# Patient Record
Sex: FEMALE | Race: WHITE | NOT HISPANIC OR LATINO | Employment: FULL TIME | URBAN - METROPOLITAN AREA
[De-identification: names, ages, dates, MRNs, and addresses within clinical notes are randomized per-mention and may not be internally consistent; named-entity substitution may affect disease eponyms.]

---

## 2017-10-17 ENCOUNTER — OFFICE VISIT (OUTPATIENT)
Dept: NEUROLOGY | Facility: CLINIC | Age: 57
End: 2017-10-17

## 2017-10-17 VITALS
BODY MASS INDEX: 22.96 KG/M2 | SYSTOLIC BLOOD PRESSURE: 132 MMHG | WEIGHT: 155 LBS | DIASTOLIC BLOOD PRESSURE: 80 MMHG | HEIGHT: 69 IN

## 2017-10-17 DIAGNOSIS — R51.9 CHRONIC DAILY HEADACHE: ICD-10-CM

## 2017-10-17 DIAGNOSIS — G43.719 INTRACTABLE CHRONIC MIGRAINE WITHOUT AURA AND WITHOUT STATUS MIGRAINOSUS: Primary | ICD-10-CM

## 2017-10-17 DIAGNOSIS — G44.40 ANALGESIC OVERUSE HEADACHE: ICD-10-CM

## 2017-10-17 DIAGNOSIS — T39.95XA ANALGESIC OVERUSE HEADACHE: ICD-10-CM

## 2017-10-17 PROCEDURE — 99244 OFF/OP CNSLTJ NEW/EST MOD 40: CPT | Performed by: PSYCHIATRY & NEUROLOGY

## 2017-10-17 RX ORDER — RIZATRIPTAN BENZOATE 5 MG/1
TABLET ORAL
Refills: 3 | COMMUNITY
Start: 2017-09-25 | End: 2018-03-30 | Stop reason: SDUPTHER

## 2017-10-17 RX ORDER — FLUTICASONE PROPIONATE 50 MCG
SPRAY, SUSPENSION (ML) NASAL
Refills: 5 | COMMUNITY
Start: 2017-09-20 | End: 2018-02-21

## 2017-10-17 RX ORDER — CETIRIZINE HYDROCHLORIDE 5 MG/1
5 TABLET ORAL DAILY
COMMUNITY
End: 2018-02-21

## 2017-10-17 RX ORDER — TOPIRAMATE 25 MG/1
TABLET ORAL
Refills: 6 | COMMUNITY
Start: 2017-10-13 | End: 2017-10-25 | Stop reason: SDUPTHER

## 2017-10-17 RX ORDER — AZELASTINE 1 MG/ML
SPRAY, METERED NASAL
Refills: 5 | COMMUNITY
Start: 2017-08-09 | End: 2018-02-21

## 2017-10-17 NOTE — PROGRESS NOTES
Subjective   Radha Stafford is a 57 y.o. female.     History of Present Illness     The following portions of the patient's history were reviewed today and updated as appropriate:  allergies, current medications, past family history, past medical history, past social history, past surgical history and problem list.      Chief complaint is headache and the patient is seen today in consultation at the request of the referring health care provider  The time I spent with the patient today was used discussing the differential diagnosis, treatment and management options and prognosis. I addressed all of the patient's questions.  The patient's recent CT scan of the head and the report reviewed by me today and both indicate no abnormality. The patient's allergy reports and outside records were also reviewed by me today and summary state that her headaches may be either attention related migraines or allergy related to to sinus problems.  Families of history of migraines in her father, and her mother had sinus problems later on in life. She has side head sinus problems for a while but she also has migraine type headaches that she started having in her 30s usually unilateral and that recurred now in July this year to get her with tension type headaches that in the back of the head go to the front and viselike.  She has been overusing over-the-counter medications but now restricts her self to 1-2 Excedrin a week.    Review of Systems   Constitutional: Negative for appetite change, chills and fatigue.   HENT: Negative for congestion, ear pain, facial swelling and sinus pressure.    Eyes: Negative for pain and redness.   Respiratory: Negative for shortness of breath.    Cardiovascular: Negative for chest pain.   Gastrointestinal: Negative for abdominal pain.   Endocrine: Negative for cold intolerance and heat intolerance.   Genitourinary: Negative for dysuria.   Musculoskeletal: Negative for arthralgias.   Skin: Negative for rash.  "  Allergic/Immunologic: Negative for immunocompromised state.   Neurological: Positive for headaches.   Hematological: Negative for adenopathy.   Psychiatric/Behavioral: Negative for hallucinations.         Objective      height is 69\" (175.3 cm) and weight is 155 lb (70.3 kg). Her blood pressure is 132/80.     The patient's general appearance was normal today  Carotid pulses were palpable bilaterally  The ophthalmological exam showed the refractory media clear, no blurring of disc margins, there were no hemorrhages noted, blood vessels appeared normal.      Neurologic Exam     Mental Status   Oriented to person.   Oriented to place. Oriented to country, city, area and street.   Oriented to time. Oriented to year, month, date, day and season.   Registration: recalls 3 of 3 objects. Recall at 5 minutes: recalls 3 of 3 objects. Follows 3 step commands.   Attention: normal.   Speech: speech is normal   Level of consciousness: alert  Knowledge: consistent with education.   Able to name object. Able to read. Able to repeat. Able to write. Normal comprehension.     Cranial Nerves     CN II   Visual fields full to confrontation.     CN III, IV, VI   Right pupil: Shape: regular. Consensual response: intact. Accommodation: intact.   Left pupil: Shape: regular. Consensual response: intact.   CN III: no CN III palsy  CN VI: no CN VI palsy  Nystagmus: none   Diplopia: none  Ophthalmoparesis: none  Upgaze: normal  Downgaze: normal  Conjugate gaze: present  Vestibulo-ocular reflex: present    CN V   Facial sensation intact.     CN VII   Facial expression full, symmetric.     CN VIII   CN VIII normal.     CN IX, X   CN IX normal.     CN XI   CN XI normal.     CN XII   CN XII normal.     Motor Exam   Muscle bulk: normal  Overall muscle tone: normal  Right arm pronator drift: absent  Left arm pronator drift: absent    Strength   Strength 5/5 except as noted.     Sensory Exam   Light touch normal.     Gait, Coordination, and Reflexes " "    Gait  Gait: normal    Coordination   Romberg: negative  Finger to nose coordination: normal  Heel to shin coordination: normal  Tandem walking coordination: normal    Tremor   Resting tremor: absent  Intention tremor: absent  Action tremor: absent    Reflexes   Reflexes 2+ except as noted.       Assessment/Plan     Radha was seen today for migraine.    Diagnoses and all orders for this visit:    Intractable chronic migraine without aura and without status migrainosus    Chronic daily headache    Analgesic overuse headache        Discussion/Summary:      The patient is to stop Topamax because she has side effects on the medications and I cannot really go up on the dose because of that. She is to try extended-release Topamax and provided her with samples where she goes up by 25 mg every 1-2 weeks until her headaches are controlled or if she develops side effects she would have to contact the office              Disclaimer: This letter was created using dragon voice recognition software.  This voice recognition software may create errors that may go undetected and can impact the meaning of a sentence or paragraph.  The most frequent error is that the software misidentifies \"he\" and \"she\". However, contextual reading is often able to identify this as a voice recognotion error.  Another frequent error is that the software misidentifies \"the patient\" as \"\"          "

## 2017-10-25 RX ORDER — TOPIRAMATE 25 MG/1
25 TABLET ORAL DAILY
Qty: 2 TABLET | Refills: 1 | Status: SHIPPED | OUTPATIENT
Start: 2017-10-25 | End: 2017-10-26

## 2017-10-26 ENCOUNTER — TELEPHONE (OUTPATIENT)
Dept: NEUROLOGY | Facility: CLINIC | Age: 57
End: 2017-10-26

## 2017-10-26 RX ORDER — TOPIRAMATE 25 MG/1
25 TABLET ORAL DAILY
Qty: 30 TABLET | Refills: 1 | Status: CANCELLED | OUTPATIENT
Start: 2017-10-26

## 2017-10-26 RX ORDER — TOPIRAMATE 50 MG/1
50 CAPSULE, EXTENDED RELEASE ORAL NIGHTLY
Qty: 30 EACH | Refills: 11 | Status: SHIPPED | OUTPATIENT
Start: 2017-10-26 | End: 2017-11-15 | Stop reason: SDUPTHER

## 2017-10-26 NOTE — TELEPHONE ENCOUNTER
----- Message from Fifi Mcmahon PA-C sent at 10/26/2017 11:32 AM EDT -----  Contact: ROZ  I just sent a script in for Qudexy 50mg nightly. Thanks!!     ----- Message -----     From: Kayla Briceno CMA     Sent: 10/26/2017  11:15 AM       To: Fifi Mcmahon PA-C    Pt said that Dr. Carcamo gave her samples of  (Topomax ER Capsules) Quedxyxr 25 mg tablets and told her to increase and she is now up to 50 mgs and wants to know if you could send her a script into the pharmacy on file please?  ----- Message -----     From: Fifi Mcmahon PA-C     Sent: 10/26/2017   9:52 AM       To: Kayla Briceno CMA    Hmm, that's strange! Would you care to ask if it was the Trokendi that he gave her? And she would like 50mg nightly? Sorry, his note didn't specify. Thanks so much!     ----- Message -----     From: Kayla Briceno CMA     Sent: 10/26/2017   9:35 AM       To: Fifi Mcmahon PA-C    This is the pt you said you could write the script for but I guess we got it wrong is there any way you could fix this?  Thanks  ----- Message -----     From: Temi Herrera     Sent: 10/26/2017   9:24 AM       To: [facility]    ERIC Garcia called about her TOPAMAX prescription that had been called in yesterday.  She went to pick it up, but they only gave her 2 (25 mg capsules).  She was confused as to why she only got 2 pills.  Also, Dr. Carcamo gave her samples that were Extended Release and she would like that again if possible in 50 mg.     If you have any question, you may call her at [phone].    [facility]/pharmacy [phone]

## 2017-11-15 ENCOUNTER — TELEPHONE (OUTPATIENT)
Dept: NEUROLOGY | Facility: CLINIC | Age: 57
End: 2017-11-15

## 2017-11-15 RX ORDER — TOPIRAMATE 50 MG/1
50 CAPSULE, EXTENDED RELEASE ORAL 2 TIMES DAILY
Qty: 60 EACH | Refills: 1 | Status: SHIPPED | OUTPATIENT
Start: 2017-11-15 | End: 2017-11-28

## 2017-11-15 NOTE — TELEPHONE ENCOUNTER
Pt called and is taking topiramate 50 mg BID, current Rx is for 50 mg daily. Is it okay for her to increase to 50 BID thanks

## 2017-11-28 ENCOUNTER — OFFICE VISIT (OUTPATIENT)
Dept: NEUROLOGY | Facility: CLINIC | Age: 57
End: 2017-11-28

## 2017-11-28 VITALS
BODY MASS INDEX: 22.96 KG/M2 | HEIGHT: 69 IN | WEIGHT: 155 LBS | SYSTOLIC BLOOD PRESSURE: 130 MMHG | DIASTOLIC BLOOD PRESSURE: 82 MMHG

## 2017-11-28 DIAGNOSIS — G43.009 MIGRAINE WITHOUT AURA AND WITHOUT STATUS MIGRAINOSUS, NOT INTRACTABLE: Primary | ICD-10-CM

## 2017-11-28 PROCEDURE — 99214 OFFICE O/P EST MOD 30 MIN: CPT | Performed by: PHYSICIAN ASSISTANT

## 2017-11-28 RX ORDER — ALPRAZOLAM 0.5 MG/1
TABLET ORAL
Refills: 0 | COMMUNITY
Start: 2017-09-25 | End: 2018-08-24

## 2017-11-28 RX ORDER — ZONISAMIDE 25 MG/1
25 CAPSULE ORAL NIGHTLY
Qty: 120 CAPSULE | Refills: 11 | Status: SHIPPED | OUTPATIENT
Start: 2017-11-28 | End: 2021-03-17

## 2017-11-28 RX ORDER — TRETINOIN 0.5 MG/G
CREAM TOPICAL
Refills: 11 | COMMUNITY
Start: 2017-11-01 | End: 2018-02-21

## 2017-11-28 RX ORDER — DOXYCYCLINE 100 MG/1
CAPSULE ORAL
Refills: 4 | COMMUNITY
Start: 2017-11-01 | End: 2018-02-21

## 2017-11-28 NOTE — PROGRESS NOTES
"Subjective     Chief Complaint: headaches      History of Present Illness   Radha Stafford is a 57 y.o. female who returns to clinic today for evaluation of headaches. She has noted symptoms for at least several years marked initially by a left sided throbbing headache. Her symptoms have worsened since 7/17 and are now daily. Her headaches typically occur in the evening and last for several hours. She notes associated nausea and photosensitivity. She denies any associated vomiting or visual changes. Her headaches are worse with increased stress.     She also notes a different type of headache. This is described as a band around her head and is worse with increased stress.     Prior evaluation has included a head CT, which was reportedly unremarkable. She is currently taking Qudexy XR 50mg BID. Though this has been somewhat beneficial, though she notes associated fatigue. She was unable to tolerate topiramate.  She also takes Excedrin 5-6 times per week, which is beneficial.       I have reviewed and confirmed the past family, social and medical history as accurate on 11/28/17.     Review of Systems   Constitutional: Negative.    HENT: Negative.    Eyes: Negative.    Respiratory: Negative.    Cardiovascular: Negative.    Gastrointestinal: Negative.    Endocrine: Negative.    Genitourinary: Negative.    Musculoskeletal: Negative.    Skin: Negative.    Allergic/Immunologic: Negative.    Neurological:        As noted in HPI   Hematological: Negative.    Psychiatric/Behavioral: Negative.        Objective     /82  Ht 69\" (175.3 cm)  Wt 155 lb (70.3 kg)  BMI 22.89 kg/m2    General appearance today is normal.       Physical Exam   Neurological: She has normal strength. She has a normal Finger-Nose-Finger Test.   Psychiatric: Her speech is normal.        Neurologic Exam     Mental Status   Speech: speech is normal   Level of consciousness: alert  Normal comprehension.     Cranial Nerves   Cranial nerves II through XII " intact.     Motor Exam   Muscle bulk: normal  Overall muscle tone: normal    Strength   Strength 5/5 throughout.     Sensory Exam   Light touch normal.     Gait, Coordination, and Reflexes     Coordination   Finger to nose coordination: normal    Tremor   Resting tremor: absent          Assessment/Plan   Radha was seen today for migraine.    Diagnoses and all orders for this visit:    Migraine without aura and without status migrainosus, not intractable    Other orders  -     zonisamide (ZONEGRAN) 25 MG capsule; Take 1 capsule by mouth Every Night.          Discussion/Summary   Radha Stafford returns to clinic today with a history of migraines. After discussing potential treatment options, it was elected to add Zonegran. We also discussed potential future treatments, including Botox. She will then follow up in 2 months, or sooner if needed.       I spent 20 minutes out of 25 minutes face to face with the patient and discussing diagnosis, prognosis, evaluation, current status, treatment options and management.    As part of this visit I discussed the history with the patient .      Fifi Mcmahon PA-C

## 2017-12-07 ENCOUNTER — TELEPHONE (OUTPATIENT)
Dept: NEUROLOGY | Facility: CLINIC | Age: 57
End: 2017-12-07

## 2018-01-30 ENCOUNTER — OFFICE VISIT (OUTPATIENT)
Dept: NEUROLOGY | Facility: CLINIC | Age: 58
End: 2018-01-30

## 2018-01-30 VITALS
BODY MASS INDEX: 23.7 KG/M2 | HEIGHT: 69 IN | SYSTOLIC BLOOD PRESSURE: 116 MMHG | WEIGHT: 160 LBS | DIASTOLIC BLOOD PRESSURE: 82 MMHG

## 2018-01-30 DIAGNOSIS — G43.009 MIGRAINE WITHOUT AURA AND WITHOUT STATUS MIGRAINOSUS, NOT INTRACTABLE: Primary | ICD-10-CM

## 2018-01-30 PROCEDURE — 99214 OFFICE O/P EST MOD 30 MIN: CPT | Performed by: PHYSICIAN ASSISTANT

## 2018-01-30 RX ORDER — AMITRIPTYLINE HYDROCHLORIDE 25 MG/1
25 TABLET, FILM COATED ORAL NIGHTLY
Qty: 120 TABLET | Refills: 11 | Status: SHIPPED | OUTPATIENT
Start: 2018-01-30 | End: 2018-03-30

## 2018-01-30 NOTE — PROGRESS NOTES
"Subjective     Chief Complaint: headaches      History of Present Illness   Radha Stafford is a 57 y.o. female who returns to clinic today for evaluation of migraines. She has noted symptoms for at least several years marked initially by a left sided throbbing headache. Her symptoms have worsened since 7/17 and are now daily. Her headaches typically occur in the evening and last for several hours. She notes associated nausea and photosensitivity. She denies any associated vomiting or visual changes. Her headaches are worse with increased stress.      She also notes a head described as a band of pressure around her head which is worse with increased stress.      Prior evaluation has included a head CT, which was reportedly unremarkable. She is currently taking ZNS 75mg nightly, which is causing some fatigue. She has tried Qudexy XR 50mg BID and TPM, though these were not tolerated.    Since her last visit in 11/17, her headaches have somewhat improved.      I have reviewed and confirmed the past family, social and medical history as accurate on 1/30/18.     Review of Systems   Constitutional: Negative.    HENT: Negative.    Eyes: Negative.    Respiratory: Negative.    Cardiovascular: Negative.    Gastrointestinal: Negative.    Endocrine: Negative.    Genitourinary: Negative.    Musculoskeletal: Negative.    Skin: Negative.    Allergic/Immunologic: Negative.    Neurological:        As noted in HPI   Hematological: Negative.    Psychiatric/Behavioral: Negative.        Objective     /82  Ht 175.3 cm (69\")  Wt 72.6 kg (160 lb)  BMI 23.63 kg/m2    General appearance today is normal.       Physical Exam   Neurological: She has normal strength. She has a normal Finger-Nose-Finger Test.   Psychiatric: Her speech is normal.        Neurologic Exam     Mental Status   Speech: speech is normal   Level of consciousness: alert  Normal comprehension.     Cranial Nerves   Cranial nerves II through XII intact.     Motor Exam "   Muscle bulk: normal  Overall muscle tone: normal    Strength   Strength 5/5 throughout.     Sensory Exam   Light touch normal.     Gait, Coordination, and Reflexes     Coordination   Finger to nose coordination: normal    Tremor   Resting tremor: absent          Assessment/Plan   Radha was seen today for migraine.    Diagnoses and all orders for this visit:    Migraine without aura and without status migrainosus, not intractable    Other orders  -     amitriptyline (ELAVIL) 25 MG tablet; Take 1 tablet by mouth Every Night.          Discussion/Summary   Radha Stafford returns to clinic today with a history of migraines. I again reviewed her current status and treatment options. After discussing potential treatment options, it was elected to continue on ZNS 75mg nightly and add Amitriptyline. We may next consider Botox therapy.She will then follow up in 2 months, or sooner if needed.       I spent 20 minutes out of 25 minutes face to face with the patient and discussing diagnosis, prognosis, evaluation, current status, treatment options and management.    As part of this visit I discussed the history with the patient .      Fifi Mcmahon PA-C

## 2018-02-09 ENCOUNTER — TELEPHONE (OUTPATIENT)
Dept: NEUROLOGY | Facility: CLINIC | Age: 58
End: 2018-02-09

## 2018-02-09 NOTE — TELEPHONE ENCOUNTER
----- Message from Alka Figueroa sent at 2/9/2018  8:52 AM EST -----  Contact: Radhakeegan Calix    Radha called and said the amitriptyline has been making her headaches worse in the morning.  She quit taking it on Saturday night and now her headaches are back to being bad in the evening. She was on 25 MG    Please call Radha back  [phone]

## 2018-02-12 RX ORDER — VENLAFAXINE HYDROCHLORIDE 37.5 MG/1
37.5 CAPSULE, EXTENDED RELEASE ORAL DAILY
Qty: 30 CAPSULE | Refills: 11 | Status: SHIPPED | OUTPATIENT
Start: 2018-02-12 | End: 2018-02-21

## 2018-02-12 NOTE — TELEPHONE ENCOUNTER
We can try Effexor 37.5mg if she would like in addition to the zonisamide. If that is not beneficial, we may need to consider Botox next. Let me know if she would like for me to call it in. Thanks!

## 2018-02-21 ENCOUNTER — OFFICE VISIT (OUTPATIENT)
Dept: NEUROLOGY | Facility: CLINIC | Age: 58
End: 2018-02-21

## 2018-02-21 VITALS
BODY MASS INDEX: 23.7 KG/M2 | SYSTOLIC BLOOD PRESSURE: 110 MMHG | WEIGHT: 160 LBS | HEIGHT: 69 IN | DIASTOLIC BLOOD PRESSURE: 64 MMHG

## 2018-02-21 DIAGNOSIS — G43.009 MIGRAINE WITHOUT AURA AND WITHOUT STATUS MIGRAINOSUS, NOT INTRACTABLE: Primary | ICD-10-CM

## 2018-02-21 PROCEDURE — 99214 OFFICE O/P EST MOD 30 MIN: CPT | Performed by: PHYSICIAN ASSISTANT

## 2018-02-21 RX ORDER — VENLAFAXINE HYDROCHLORIDE 37.5 MG/1
37.5 CAPSULE, EXTENDED RELEASE ORAL DAILY
Qty: 30 CAPSULE | Refills: 11 | Status: SHIPPED | OUTPATIENT
Start: 2018-02-21 | End: 2018-03-30

## 2018-02-21 NOTE — PROGRESS NOTES
"Subjective     Chief Complaint: headaches      History of Present Illness   Radha Stafford is a 57 y.o. female who returns to clinic today for evaluation of migraines. She has noted symptoms for at least several years marked initially by a left sided throbbing headache. Her symptoms have worsened since 7/17 and are now daily. Her headaches typically occur in the evening and last for several hours. She notes associated nausea and photosensitivity. She denies any associated vomiting or visual changes. Her headaches are worse with increased stress.       She also notes a head described as a band of pressure around her head which is worse with increased stress.       Prior evaluation has included a head CT, which was reportedly unremarkable. She is currently taking ZNS 100mg nightly, which is causing some fatigue. She has tried Qudexy XR 50mg BID, TPM and amitriptyline, though these were not tolerated.    Since her last visit in 1/30/18, she notes that her headaches have worsened. She notes over 15 headaches a month, lasting at least 4 hours per day.       I have reviewed and confirmed the past family, social and medical history as accurate on 2/21/18.     Review of Systems   Constitutional: Negative.    HENT: Negative.    Eyes: Negative.    Respiratory: Negative.    Cardiovascular: Negative.    Gastrointestinal: Negative.    Endocrine: Negative.    Genitourinary: Negative.    Musculoskeletal: Negative.    Skin: Negative.    Allergic/Immunologic: Negative.    Neurological:        As noted in HPI   Hematological: Negative.    Psychiatric/Behavioral: Negative.        Objective     /64  Ht 175.3 cm (69\")  Wt 72.6 kg (160 lb)  BMI 23.63 kg/m2    General appearance today is normal.       Physical Exam   Neurological: She has normal strength. She has a normal Finger-Nose-Finger Test.   Psychiatric: Her speech is normal.        Neurologic Exam     Mental Status   Speech: speech is normal   Level of consciousness: " alert  Normal comprehension.     Cranial Nerves   Cranial nerves II through XII intact.     Motor Exam   Muscle bulk: normal  Overall muscle tone: normal    Strength   Strength 5/5 throughout.     Sensory Exam   Light touch normal.     Gait, Coordination, and Reflexes     Coordination   Finger to nose coordination: normal    Tremor   Resting tremor: absent          Assessment/Plan   Radha was seen today for migraine.    Diagnoses and all orders for this visit:    Migraine without aura and without status migrainosus, not intractable    Other orders  -     venlafaxine XR (EFFEXOR XR) 37.5 MG 24 hr capsule; Take 1 capsule by mouth Daily.          Discussion/Summary   Radha Stafford returns to clinic today with a history of migraines. I again reviewed her current status and treatment options. After discussing potential treatment options, it was elected to add Effexor XR 37.5mg daily and continue on ZNS unchanged. We will also begin Botox therapy given the severity and frequency of her headaches. We discussed risk and benefits of Botox procedure and common side effects including headache, neck pain, neck stiffness or weakness, ptosis, flu-like symptoms as well as more serious possible adverse effects. She will then follow up in 4-6 weeks, or soon if needed.       I spent 20 minutes out of 25 minutes face to face with the patient and discussing diagnosis, prognosis, diagnostic testing, evaluation, current status, treatment options and management.    As part of this visit I discussed the history with the patient .      Fifi Mcmahon PA-C

## 2018-03-30 ENCOUNTER — OFFICE VISIT (OUTPATIENT)
Dept: NEUROLOGY | Facility: CLINIC | Age: 58
End: 2018-03-30

## 2018-03-30 VITALS — BODY MASS INDEX: 23.7 KG/M2 | HEIGHT: 69 IN | WEIGHT: 160 LBS

## 2018-03-30 DIAGNOSIS — G43.009 MIGRAINE WITHOUT AURA AND WITHOUT STATUS MIGRAINOSUS, NOT INTRACTABLE: Primary | ICD-10-CM

## 2018-03-30 PROCEDURE — 99214 OFFICE O/P EST MOD 30 MIN: CPT | Performed by: PHYSICIAN ASSISTANT

## 2018-03-30 RX ORDER — RIZATRIPTAN BENZOATE 5 MG/1
5 TABLET ORAL DAILY PRN
Qty: 9 TABLET | Refills: 5 | Status: SHIPPED | OUTPATIENT
Start: 2018-03-30 | End: 2018-12-27 | Stop reason: SDUPTHER

## 2018-03-30 RX ORDER — VENLAFAXINE HYDROCHLORIDE 75 MG/1
75 CAPSULE, EXTENDED RELEASE ORAL DAILY
Qty: 30 CAPSULE | Refills: 11 | Status: SHIPPED | OUTPATIENT
Start: 2018-03-30 | End: 2019-03-28 | Stop reason: SDUPTHER

## 2018-03-30 NOTE — PROGRESS NOTES
"Subjective     Chief Complaint: headaches      History of Present Illness   Radha Stafford is a 57 y.o. female who returns to clinic today for evaluation of migraines. She has noted symptoms for at least several years marked initially by a left sided throbbing headache. Her symptoms have worsened since 7/17 and are now daily. Her headaches typically occur in the evening and last for several hours. She notes associated nausea and photosensitivity. She denies any associated vomiting or visual changes. Her headaches are worse with increased stress.       She also notes a head described as a band of pressure around her head which is worse with increased stress.       Prior evaluation has included a head CT, which was reportedly unremarkable. She is currently taking ZNS 100mg nightly, which is causing some fatigue. She is also taking Effexor XR 37.5mg, which has been somewhat beneficial.  She has tried Qudexy XR 50mg BID, TPM and amitriptyline, though these were not tolerate    She continues to note over 15 headaches a month, lasting at least 4 hours per day.       I have reviewed and confirmed the past family, social and medical history as accurate on 2/21/18.     Review of Systems   Constitutional: Negative.    HENT: Negative.    Eyes: Negative.    Respiratory: Negative.    Cardiovascular: Negative.    Gastrointestinal: Negative.    Endocrine: Negative.    Genitourinary: Negative.    Musculoskeletal: Negative.    Skin: Negative.    Allergic/Immunologic: Negative.    Neurological:        As noted in HPI   Hematological: Negative.    Psychiatric/Behavioral: Negative.        Objective     Ht 175.3 cm (69\")   Wt 72.6 kg (160 lb)   BMI 23.63 kg/m²     General appearance today is normal.     Physical Exam   Neurological: She has normal strength. She has a normal Finger-Nose-Finger Test.   Psychiatric: Her speech is normal.        Neurologic Exam     Mental Status   Speech: speech is normal   Level of consciousness: " alert  Normal comprehension.     Cranial Nerves   Cranial nerves II through XII intact.     Motor Exam   Muscle bulk: normal  Overall muscle tone: normal    Strength   Strength 5/5 throughout.     Sensory Exam   Light touch normal.     Gait, Coordination, and Reflexes     Coordination   Finger to nose coordination: normal    Tremor   Resting tremor: absent        Assessment/Plan   Radha was seen today for migraine.    Diagnoses and all orders for this visit:    Migraine without aura and without status migrainosus, not intractable    Other orders  -     venlafaxine XR (EFFEXOR XR) 75 MG 24 hr capsule; Take 1 capsule by mouth Daily.  -     rizatriptan (MAXALT) 5 MG tablet; Take 1 tablet by mouth Daily As Needed for Migraine. May repeat in 2 hours if needed          Discussion/Summary   Radha Stafford returns to clinic today with a history of migraines. I again reviewed her current status and treatment options. After discussing potential treatment options, it was elected to increase Effexor XR to 75mg daily and continue on ZNS unchanged. We will next consider Botox therapy given the severity and frequency of her headaches. We discussed risk and benefits of Botox procedure and common side effects including headache, neck pain, neck stiffness or weakness, ptosis, flu-like symptoms as well as more serious possible adverse effects. She will then follow up in 6 weeks, or soon if needed.       I spent 20 minutes out of 25 minutes face to face with the patient and discussing diagnosis, evaluation, current status, treatment options and management.    As part of this visit I discussed the history with the patient .      Fifi Mcmahon PA-C

## 2018-05-29 ENCOUNTER — OFFICE VISIT (OUTPATIENT)
Dept: NEUROLOGY | Facility: CLINIC | Age: 58
End: 2018-05-29

## 2018-05-29 VITALS
DIASTOLIC BLOOD PRESSURE: 80 MMHG | SYSTOLIC BLOOD PRESSURE: 115 MMHG | HEIGHT: 69 IN | WEIGHT: 160 LBS | BODY MASS INDEX: 23.7 KG/M2

## 2018-05-29 DIAGNOSIS — G43.009 MIGRAINE WITHOUT AURA AND WITHOUT STATUS MIGRAINOSUS, NOT INTRACTABLE: Primary | ICD-10-CM

## 2018-05-29 PROCEDURE — 99214 OFFICE O/P EST MOD 30 MIN: CPT | Performed by: PHYSICIAN ASSISTANT

## 2018-05-29 RX ORDER — LEVOFLOXACIN 500 MG/1
TABLET, FILM COATED ORAL
COMMUNITY
Start: 2018-05-24 | End: 2018-08-24

## 2018-05-29 RX ORDER — FLUTICASONE PROPIONATE 50 MCG
SPRAY, SUSPENSION (ML) NASAL
Refills: 5 | COMMUNITY
Start: 2018-05-24

## 2018-05-29 NOTE — PROGRESS NOTES
"Subjective     Chief Complaint: headachese     History of Present Illness   Radha Stafford is a 57 y.o. female who returns to clinic today for evaluation of migraines. She has noted symptoms for at least several years marked initially by a left sided throbbing headache. Her symptoms have worsened since 7/17 and are now daily. Her headaches typically occur in the evening and last for several hours. She notes associated nausea and photosensitivity. She denies any associated vomiting or visual changes. Her headaches are worse with increased stress.       She also notes a head described as a band of pressure around her head which is worse with increased stress.       Prior evaluation has included a head CT, which was reportedly unremarkable. She is currently taking ZNS 100mg nightly, which is causing some fatigue. She is also taking Effexor XR 75mg, which has been somewhat beneficial and Maxalt as needed.  She has tried Qudexy XR 50mg BID, TPM and amitriptyline, though these were not tolerate     Today: Since her last visit in 3/18, she notes that her headaches have improved in frequency and severity.       I have reviewed and confirmed the past family, social and medical history as accurate on 5/29/18.     Review of Systems   Constitutional: Negative.    Eyes: Negative.    Respiratory: Negative.    Cardiovascular: Negative.    Gastrointestinal: Negative.    Endocrine: Negative.    Genitourinary: Negative.    Musculoskeletal: Negative.    Skin: Negative.    Allergic/Immunologic: Negative.    Neurological: Positive for headaches.   Hematological: Negative.    Psychiatric/Behavioral: Negative.        Objective     /80   Ht 175.3 cm (69.02\")   Wt 72.6 kg (160 lb)   BMI 23.62 kg/m²     General appearance today is normal.       Physical Exam   Neurological: She has normal strength. She has a normal Finger-Nose-Finger Test.   Psychiatric: Her speech is normal.        Neurologic Exam     Mental Status   Speech: speech is " normal   Level of consciousness: alert  Normal comprehension.     Cranial Nerves   Cranial nerves II through XII intact.     Motor Exam   Muscle bulk: normal  Overall muscle tone: normal    Strength   Strength 5/5 throughout.     Sensory Exam   Light touch normal.     Gait, Coordination, and Reflexes     Coordination   Finger to nose coordination: normal    Tremor   Resting tremor: absent        Assessment/Plan   Radha was seen today for migraine.    Diagnoses and all orders for this visit:    Migraine without aura and without status migrainosus, not intractable          Discussion/Summary   Radha Stafford returns to clinic today with a history of migraines. I again reviewed her current status and treatment options. After discussing potential treatment options, it was elected to continue on her current medications unchanged. We discussed botox treatment and Aimovig, both of which she may consider in the future. She will then follow up in 1 month, or sooner if needed.   I spent 20 minutes out of 25 minutes face to face with the patient and discussing diagnosis, prognosis, evaluation, current status, treatment options and management as discussed above.       As part of this visit I discussed the history with the patient .      Fifi Mcmahon PA-C

## 2018-06-13 ENCOUNTER — TELEPHONE (OUTPATIENT)
Dept: NEUROLOGY | Facility: CLINIC | Age: 58
End: 2018-06-13

## 2018-06-13 NOTE — TELEPHONE ENCOUNTER
Unfortunately, one of the only options that would not cause sedation would be increasing her Effexor to 150mg. We had discussed Aimovig at her last visit, which I think still may be a very good option. The primary side effect sometimes associated with this medication is constipation. Let me know what she would like to do. Thanks!

## 2018-06-13 NOTE — TELEPHONE ENCOUNTER
----- Message from Alka Figueroa sent at 6/13/2018  1:48 PM EDT -----  Contact: Radha Garcia called regarding her prescriptions. She's taking Zonisamide at night and Effexor in morning. She's really tired in the evenings, but doing okay at work. Around 3-4 pm, she starts to get headaches.    Radha wondered if there was any other way to take the medications that would maybe make her less tired?    Please call Radha back  [phone]

## 2018-06-13 NOTE — TELEPHONE ENCOUNTER
Spoke with PT, she is going to hold off on making any med changes or adding a new med. She wants to wait for her FU on 7/6/18 @ 1130 to discuss possibly starting Aimovig injections.

## 2018-07-06 ENCOUNTER — OFFICE VISIT (OUTPATIENT)
Dept: NEUROLOGY | Facility: CLINIC | Age: 58
End: 2018-07-06

## 2018-07-06 ENCOUNTER — TELEPHONE (OUTPATIENT)
Dept: NEUROLOGY | Facility: CLINIC | Age: 58
End: 2018-07-06

## 2018-07-06 VITALS
SYSTOLIC BLOOD PRESSURE: 126 MMHG | BODY MASS INDEX: 23.7 KG/M2 | HEIGHT: 69 IN | WEIGHT: 160 LBS | DIASTOLIC BLOOD PRESSURE: 62 MMHG

## 2018-07-06 DIAGNOSIS — G43.009 MIGRAINE WITHOUT AURA AND WITHOUT STATUS MIGRAINOSUS, NOT INTRACTABLE: Primary | ICD-10-CM

## 2018-07-06 PROCEDURE — 99214 OFFICE O/P EST MOD 30 MIN: CPT | Performed by: PHYSICIAN ASSISTANT

## 2018-07-06 NOTE — TELEPHONE ENCOUNTER
----- Message from Fifi Mcmahon PA-C sent at 7/6/2018  1:35 PM EDT -----  Kayla,    Would you care to call and let Ms. Stafford know that I looked into insurance coverage with Aimovig. Her previous approval for Botox should not be any issue for approval. If she would like, we can go ahead and start the approval process. Just let me know. Thanks!!

## 2018-07-06 NOTE — PROGRESS NOTES
"Subjective     Chief Complaint: headache      History of Present Illness   Radha Stafford is a 57 y.o. female who returns to clinic today for evaluation of migraines. She has noted symptoms for at least several years marked initially by a left sided throbbing headache. Her symptoms have worsened since 7/17 and are now daily. Her headaches typically occur in the evening and last for several hours. She notes associated nausea and photosensitivity. She denies any associated vomiting or visual changes. Her headaches are worse with increased stress.       She also notes a head described as a band of pressure around her head which is worse with increased stress.       Prior evaluation has included a head CT, which was reportedly unremarkable. She is currently taking ZNS 100mg nightly, which is causing some fatigue. She is also taking Effexor XR 75mg, which has been somewhat beneficial and Maxalt as needed.  She has tried Qudexy XR 50mg BID, TPM and amitriptyline, though these were not tolerate    Today: Since her last visit in 5/18, she notes that her headaches are unchanged.        I have reviewed and confirmed the past family, social and medical history as accurate on 7/6/18.     Review of Systems   Constitutional: Negative.    Eyes: Negative.    Respiratory: Negative.    Cardiovascular: Negative.    Gastrointestinal: Negative.    Endocrine: Negative.    Genitourinary: Negative.    Musculoskeletal: Negative.    Skin: Negative.    Allergic/Immunologic: Negative.    Neurological: Positive for headaches.   Hematological: Negative.    Psychiatric/Behavioral: Negative.        Objective     /62   Ht 175.3 cm (69\")   Wt 72.6 kg (160 lb)   BMI 23.63 kg/m²     General appearance today is normal.     Physical Exam   Neurological: She has normal strength. She has a normal Finger-Nose-Finger Test. Gait normal.   Psychiatric: Her speech is normal.        Neurologic Exam     Mental Status   Speech: speech is normal   Level of " consciousness: alert  Normal comprehension.     Cranial Nerves   Cranial nerves II through XII intact.     Motor Exam   Muscle bulk: normal  Overall muscle tone: normal    Strength   Strength 5/5 throughout.     Sensory Exam   Light touch normal.     Gait, Coordination, and Reflexes     Gait  Gait: normal    Coordination   Finger to nose coordination: normal    Tremor   Resting tremor: absent          Assessment/Plan   Radha was seen today for migraine.    Diagnoses and all orders for this visit:    Migraine without aura and without status migrainosus, not intractable  -     erenumab-aooe (AIMOVIG) prefilled syringe 70 mg; Inject 70 mg under the skin 1 (One) Time.          Discussion/Summary   Radha Stafford returns to clinic today with a history of migraines. I again reviewed her current status and treatment options. After discussing potential treatment options, it was elected to continue on ZNS and Effexor unchanged. We will also add Aimovig. Additionally, I have given her samples of Cambia to try as needed. She will then follow up in 6-8 weeks, or sooner if needed.   I spent 20 minutes out of 25 minutes face to face with the patient and discussing diagnosis, evaluation, current status, treatment options and management as discussed above.       As part of this visit I discussed the history with the patient .      Fifi Mcmahon PA-C

## 2018-07-20 NOTE — TELEPHONE ENCOUNTER
Spoke with Aimovig rep  ICD 10 is listed as the Pt's Dx code which I believe should actually be G43.009.  Rep states this code needs to be written along with the case number on an Rx pad and faxed to them along with the [employer] Consent form with Pt's info and electronic signature.    case # [id number]  Fx# [phone]    Taking care of this now. Pt Notified.

## 2018-07-20 NOTE — TELEPHONE ENCOUNTER
PATIENT WANTED TO KNOW WHERE HER AIMOVIG WAS AT IN THE PROCESS     ALSO HER HEADACHES ARE MORE INTENSE IN THE EVENINGS.     SHE WANTED TO KNOW IF THERE IS SOMETHING SHE NEEDED TO DO DIFFERENTLY. SHE IS A BIT AFRAID TO INCREASE HER MEDICATION BECAUSE IT MAKES HER FEEL LIKE SHE IS 'OUT OF IT' AND KIND OF IN [place]'

## 2018-08-24 ENCOUNTER — OFFICE VISIT (OUTPATIENT)
Dept: NEUROLOGY | Facility: CLINIC | Age: 58
End: 2018-08-24

## 2018-08-24 VITALS
WEIGHT: 160 LBS | SYSTOLIC BLOOD PRESSURE: 118 MMHG | DIASTOLIC BLOOD PRESSURE: 68 MMHG | BODY MASS INDEX: 23.7 KG/M2 | HEIGHT: 69 IN

## 2018-08-24 DIAGNOSIS — G43.009 MIGRAINE WITHOUT AURA AND WITHOUT STATUS MIGRAINOSUS, NOT INTRACTABLE: Primary | ICD-10-CM

## 2018-08-24 PROCEDURE — 99214 OFFICE O/P EST MOD 30 MIN: CPT | Performed by: PHYSICIAN ASSISTANT

## 2018-08-24 RX ORDER — EPINEPHRINE 0.3 MG/.3ML
INJECTION INTRAMUSCULAR
COMMUNITY
Start: 2018-07-02

## 2018-08-24 NOTE — PROGRESS NOTES
"Subjective     Chief Complaint: headaches      History of Present Illness   Radha Stafford is a 58 y.o. female who returns to clinic today for evaluation of migraines. She has noted symptoms for at least several years marked initially by a left sided throbbing headache. Her symptoms have worsened since 7/17 and are now daily. Her headaches typically occur in the evening and last for several hours. She notes associated nausea and photosensitivity. She denies any associated vomiting or visual changes. Her headaches are worse with increased stress.       She also notes a head described as a band of pressure around her head which is worse with increased stress.       Prior evaluation has included a head CT, which was reportedly unremarkable. She is currently taking ZNS 100mg nightly, which is causing some fatigue. She is also taking Effexor XR 75mg, which has been somewhat beneficial and Maxalt as needed.  She has tried Qudexy XR 50mg BID, TPM and amitriptyline, though these were not tolerated.     Today: Since her last visit in 7/18, she notes that her headaches have somewhat improved since she cut gluten out of her diet. She continues to note 1-2 headaches a week, primarily after exercising.       I have reviewed and confirmed the past family, social and medical history as accurate on 8/24/18.     Review of Systems   Constitutional: Negative.    Eyes: Negative.    Respiratory: Negative.    Cardiovascular: Negative.    Gastrointestinal: Negative.    Endocrine: Negative.    Genitourinary: Negative.    Musculoskeletal: Negative.    Skin: Negative.    Allergic/Immunologic: Negative.    Neurological: Positive for headaches.   Hematological: Negative.    Psychiatric/Behavioral: Negative.        Objective     /68   Ht 175.3 cm (69\")   Wt 72.6 kg (160 lb)   BMI 23.63 kg/m²     General appearance today is normal.       Physical Exam   Neurological: She has normal strength. She has a normal Finger-Nose-Finger Test. "   Psychiatric: Her speech is normal.        Neurologic Exam     Mental Status   Speech: speech is normal   Level of consciousness: alert  Normal comprehension.     Cranial Nerves   Cranial nerves II through XII intact.     Motor Exam   Muscle bulk: normal  Overall muscle tone: normal    Strength   Strength 5/5 throughout.     Sensory Exam   Light touch normal.     Gait, Coordination, and Reflexes     Coordination   Finger to nose coordination: normal          Assessment/Plan   Radha was seen today for migraine.    Diagnoses and all orders for this visit:    Migraine without aura and without status migrainosus, not intractable  -     MRI Brain Without Contrast          Discussion/Summary   Radha Stafford returns to clinic today with a history of migraines. I again reviewed her current status and treatment options. It was elected to obtain an MRI of the brain. After discussing potential treatment options, it was elected to continue on ZNS and Effexor unchanged and add Aimovig (sample administered today in clinic). She will then follow up in 2 months , or sooner if needed.   I spent 25 minutes minutes face to face with the patient with 20 minutes spent on discussing diagnosis, prognosis, diagnostic testing, evaluation, current status, treatment options and management as discussed above.       As part of this visit I discussed the history with the patient .      Fifi Mcmahon PA-C

## 2018-08-28 ENCOUNTER — HOSPITAL ENCOUNTER (OUTPATIENT)
Dept: MRI IMAGING | Facility: HOSPITAL | Age: 58
Discharge: HOME | End: 2018-08-28
Admitting: PHYSICIAN ASSISTANT

## 2018-08-28 PROCEDURE — 70551 MRI BRAIN STEM W/O DYE: CPT

## 2018-08-29 ENCOUNTER — TELEPHONE (OUTPATIENT)
Dept: NEUROLOGY | Facility: CLINIC | Age: 58
End: 2018-08-29

## 2018-09-20 ENCOUNTER — TELEPHONE (OUTPATIENT)
Dept: NEUROLOGY | Facility: CLINIC | Age: 58
End: 2018-09-20

## 2018-10-25 ENCOUNTER — OFFICE VISIT (OUTPATIENT)
Dept: NEUROLOGY | Facility: CLINIC | Age: 58
End: 2018-10-25

## 2018-10-25 VITALS
HEIGHT: 69 IN | WEIGHT: 160 LBS | SYSTOLIC BLOOD PRESSURE: 120 MMHG | DIASTOLIC BLOOD PRESSURE: 72 MMHG | BODY MASS INDEX: 23.7 KG/M2

## 2018-10-25 DIAGNOSIS — G43.009 MIGRAINE WITHOUT AURA AND WITHOUT STATUS MIGRAINOSUS, NOT INTRACTABLE: Primary | ICD-10-CM

## 2018-10-25 PROCEDURE — 99214 OFFICE O/P EST MOD 30 MIN: CPT | Performed by: PHYSICIAN ASSISTANT

## 2018-10-25 NOTE — PROGRESS NOTES
"Subjective     Chief Complaint: headaches      History of Present Illness   Radha Stafford is a 58 y.o. female who returns to clinic today for evaluation of migraines. She has noted symptoms for at least several years marked initially by a left sided throbbing headache. Her symptoms have worsened since 7/17 and are now daily. Her headaches typically occur in the evening and last for several hours. She notes associated nausea and photosensitivity. She denies any associated vomiting or visual changes. Her headaches are worse with increased stress.       She also notes a head described as a band of pressure around her head which is worse with increased stress.       Prior evaluation has included an MRI of the brain, which was reportedly unremarkable. She is taking Aimovig and Effexor XR 75mg as well as Maxalt as needed.  She has tried Qudexy XR 50mg BID, TPM, amitriptyline and zonisamide, though these were not tolerated well.     Today: Since her last visit in 7/18, she notes that her headaches have significantly improved and are currently manageable.        I have reviewed and confirmed the past family, social and medical history as accurate on 10/25/18.     Review of Systems   Constitutional: Negative.    Eyes: Negative.    Respiratory: Negative.    Cardiovascular: Negative.    Gastrointestinal: Negative.    Endocrine: Negative.    Genitourinary: Negative.    Musculoskeletal: Negative.    Skin: Negative.    Allergic/Immunologic: Negative.    Neurological: Positive for headaches.   Hematological: Negative.    Psychiatric/Behavioral: Negative.        Objective     Ht 175.3 cm (69\")   Wt 72.6 kg (160 lb)   BMI 23.63 kg/m²     General appearance today is normal.     Physical Exam   Neurological: She has normal strength. She has a normal Finger-Nose-Finger Test. Gait normal.   Psychiatric: Her speech is normal.        Neurologic Exam     Mental Status   Speech: speech is normal   Level of consciousness: alert  Normal " comprehension.     Cranial Nerves   Cranial nerves II through XII intact.     Motor Exam   Muscle bulk: normal  Overall muscle tone: normal    Strength   Strength 5/5 throughout.     Sensory Exam   Light touch normal.     Gait, Coordination, and Reflexes     Gait  Gait: normal    Coordination   Finger to nose coordination: normal    Tremor   Resting tremor: absent        Assessment/Plan   Radha was seen today for migraine.    Diagnoses and all orders for this visit:    Migraine without aura and without status migrainosus, not intractable          Discussion/Summary   Radha Stafford returns to clinic today with a history of migraines. I again reviewed her current status and treatment options. After discussing potential treatment options, it was elected to continue on Effexor XR and Aimovig unchanged as this has been quite beneficial. She will then follow up in 6 months, or sooner if needed.   I spent 25 minutes minutes face to face with the patient with 20 minutes spent on discussing diagnosis, prognosis, evaluation, current status, treatment options and management as discussed above.       As part of this visit I discussed the history with the patient .      Fifi Mcmahon PA-C

## 2018-12-28 RX ORDER — RIZATRIPTAN BENZOATE 5 MG/1
TABLET ORAL
Qty: 9 TABLET | Refills: 0 | Status: SHIPPED | OUTPATIENT
Start: 2018-12-28 | End: 2019-02-15 | Stop reason: SDUPTHER

## 2019-02-15 ENCOUNTER — TELEPHONE (OUTPATIENT)
Dept: NEUROLOGY | Facility: CLINIC | Age: 59
End: 2019-02-15

## 2019-02-15 RX ORDER — RIZATRIPTAN BENZOATE 5 MG/1
5 TABLET ORAL ONCE AS NEEDED
Qty: 9 TABLET | Refills: 0 | Status: SHIPPED | OUTPATIENT
Start: 2019-02-15 | End: 2019-04-01 | Stop reason: SDUPTHER

## 2019-02-15 NOTE — TELEPHONE ENCOUNTER
----- Message from Prabhjot Perry sent at 2/15/2019 11:43 AM EST -----  Contact: QUANG MURDOCK:    REFILL ON : rizatriptan (MAXALT) 5 MG tablet  PHARMACY: [facility]

## 2019-03-28 RX ORDER — VENLAFAXINE HYDROCHLORIDE 75 MG/1
CAPSULE, EXTENDED RELEASE ORAL
Qty: 30 CAPSULE | Refills: 1 | Status: SHIPPED | OUTPATIENT
Start: 2019-03-28 | End: 2019-05-28 | Stop reason: SDUPTHER

## 2019-04-01 RX ORDER — RIZATRIPTAN BENZOATE 5 MG/1
5 TABLET ORAL ONCE AS NEEDED
Qty: 9 TABLET | Refills: 0 | Status: SHIPPED | OUTPATIENT
Start: 2019-04-01

## 2019-04-25 ENCOUNTER — OFFICE VISIT (OUTPATIENT)
Dept: NEUROLOGY | Facility: CLINIC | Age: 59
End: 2019-04-25

## 2019-04-25 VITALS
HEIGHT: 69 IN | WEIGHT: 160 LBS | DIASTOLIC BLOOD PRESSURE: 88 MMHG | HEART RATE: 86 BPM | SYSTOLIC BLOOD PRESSURE: 128 MMHG | BODY MASS INDEX: 23.7 KG/M2

## 2019-04-25 DIAGNOSIS — G43.009 MIGRAINE WITHOUT AURA AND WITHOUT STATUS MIGRAINOSUS, NOT INTRACTABLE: Primary | ICD-10-CM

## 2019-04-25 PROCEDURE — 99214 OFFICE O/P EST MOD 30 MIN: CPT | Performed by: PHYSICIAN ASSISTANT

## 2019-04-25 RX ORDER — ERENUMAB-AOOE 70 MG/ML
INJECTION SUBCUTANEOUS
Refills: 4 | COMMUNITY
Start: 2019-04-13 | End: 2019-04-25

## 2019-04-25 RX ORDER — POLYETHYLENE GLYCOL 3350, SODIUM SULFATE, SODIUM CHLORIDE, POTASSIUM CHLORIDE, ASCORBIC ACID, SODIUM ASCORBATE 140-9-5.2G
KIT ORAL SEE ADMIN INSTRUCTIONS
Refills: 0 | COMMUNITY
Start: 2019-04-23

## 2019-04-25 NOTE — TELEPHONE ENCOUNTER
She needed the injection to go to [facility] and had informed me of this in the room ,but I did not get it changed in time before Fifi Mcmahon sent it through. Sorry!    Reordered and sent to correct pharmacy.

## 2019-04-25 NOTE — PROGRESS NOTES
"Subjective     Chief Complaint: headaches      History of Present Illness   Radha Stafford is a 58 y.o. female who returns to clinic today for evaluation of migraines. She has noted symptoms for at least several years marked initially by a left sided throbbing headache. Her symptoms have worsened since 7/17 and are now daily. Her headaches typically occur in the evening and last for several hours. She notes associated nausea and photosensitivity. She denies any associated vomiting or visual changes. Her headaches are worse with increased stress.       She also notes a head described as a band of pressure around her head which is worse with increased stress.       Prior evaluation has included an MRI of the brain, which was reportedly unremarkable. She is taking Aimovig and Effexor XR 75mg as well as Maxalt as needed. She has tried Qudexy XR 50mg BID, TPM, amitriptyline and zonisamide, though these were not tolerated well.     Today: Since her last visit in 10/18, she notes that her headaches continue to be improved. She has noted constipation with Aimovig.       I have reviewed and confirmed the past family, social and medical history as accurate on 4/25/19.     Review of Systems   Constitutional: Negative.    Eyes: Negative.    Respiratory: Negative.    Cardiovascular: Negative.    Gastrointestinal: Negative.    Endocrine: Negative.    Genitourinary: Negative.    Musculoskeletal: Negative.    Skin: Negative.    Allergic/Immunologic: Negative.    Neurological: Positive for headaches.   Hematological: Negative.    Psychiatric/Behavioral: Negative.        Objective     /88   Pulse 86   Ht 175.3 cm (69\")   Wt 72.6 kg (160 lb)   BMI 23.63 kg/m²     General appearance today is normal.       Physical Exam   Neurological: She has normal strength. She has a normal Finger-Nose-Finger Test. Gait normal.   Psychiatric: Her speech is normal.        Neurologic Exam     Mental Status   Speech: speech is normal   Level of " consciousness: alert  Normal comprehension.     Cranial Nerves   Cranial nerves II through XII intact.     Motor Exam   Muscle bulk: normal  Overall muscle tone: normal    Strength   Strength 5/5 throughout.     Sensory Exam   Light touch normal.     Gait, Coordination, and Reflexes     Gait  Gait: normal    Coordination   Finger to nose coordination: normal    Tremor   Resting tremor: absent        Assessment/Plan   Radha was seen today for migraine.    Diagnoses and all orders for this visit:    Migraine without aura and without status migrainosus, not intractable    Other orders  -     Fremanezumab-vfrm 225 MG/1.5ML solution prefilled syringe; Inject 225 mg under the skin into the appropriate area as directed Every 30 (Thirty) Days.          Discussion/Summary   Radha Stafford returns to clinic today with a history of migraines. I again reviewed her current status and treatment options. After discussing potential treatment options, it was elected to add Ajovy and continue on Effexor XR unchanged. She will then follow up in 6 weeks, or sooner if needed.   I spent 25 minutes face to face with the patient with 15 minutes spent on discussing diagnosis, prognosis, evaluation, current status, treatment options and management as discussed above.       As part of this visit I discussed the history with the patient .      Fifi Mcmahon PA-C

## 2019-05-28 RX ORDER — VENLAFAXINE HYDROCHLORIDE 75 MG/1
CAPSULE, EXTENDED RELEASE ORAL
Qty: 30 CAPSULE | Refills: 1 | Status: SHIPPED | OUTPATIENT
Start: 2019-05-28 | End: 2019-07-26 | Stop reason: SDUPTHER

## 2019-06-03 ENCOUNTER — PRIOR AUTHORIZATION (OUTPATIENT)
Dept: NEUROLOGY | Facility: CLINIC | Age: 59
End: 2019-06-03

## 2019-06-06 ENCOUNTER — OFFICE VISIT (OUTPATIENT)
Dept: NEUROLOGY | Facility: CLINIC | Age: 59
End: 2019-06-06

## 2019-06-06 VITALS
SYSTOLIC BLOOD PRESSURE: 118 MMHG | WEIGHT: 160 LBS | HEIGHT: 69 IN | DIASTOLIC BLOOD PRESSURE: 76 MMHG | BODY MASS INDEX: 23.7 KG/M2

## 2019-06-06 DIAGNOSIS — G43.009 MIGRAINE WITHOUT AURA AND WITHOUT STATUS MIGRAINOSUS, NOT INTRACTABLE: Primary | ICD-10-CM

## 2019-06-06 PROCEDURE — 99213 OFFICE O/P EST LOW 20 MIN: CPT | Performed by: PHYSICIAN ASSISTANT

## 2019-06-06 NOTE — PROGRESS NOTES
"Subjective     Chief Complaint: headaches      History of Present Illness   Radha Stafford is a 58 y.o. female who returns to clinic today for evaluation of migraines. She has noted symptoms for at least several years marked initially by a left sided throbbing headache. Her symptoms have worsened since 7/17 and are now daily. Her headaches typically occur in the evening and last for several hours. She notes associated nausea and photosensitivity. She denies any associated vomiting or visual changes. Her headaches are worse with increased stress.       She also notes a head described as a band of pressure around her head which is worse with increased stress.       Prior evaluation has included an MRI of the brain, which was reportedly unremarkable. She is taking Ajovy and Effexor XR 75mg as well as Maxalt as needed. She has tried aimovig, Qudexy XR 50mg BID, TPM, amitriptyline and zonisamide, though these were not tolerated well.     Today: Since her last visit in 4/19, she notes that her headaches have significantly improved. She has not had any migraines since mid April.       I have reviewed and confirmed the past family, social and medical history as accurate on 6/6/19.     Review of Systems   Constitutional: Negative.    Eyes: Negative.    Respiratory: Negative.    Cardiovascular: Negative.    Gastrointestinal: Negative.    Endocrine: Negative.    Genitourinary: Negative.    Musculoskeletal: Negative.    Skin: Negative.    Allergic/Immunologic: Negative.    Neurological: Positive for headaches.   Hematological: Negative.    Psychiatric/Behavioral: Negative.        Objective     /76   Ht 175.3 cm (69\")   Wt 72.6 kg (160 lb)   BMI 23.63 kg/m²     General appearance today is normal.       Physical Exam   Neurological: She has normal strength. She has a normal Finger-Nose-Finger Test.   Psychiatric: Her speech is normal.        Neurologic Exam     Mental Status   Speech: speech is normal   Level of " consciousness: alert  Normal comprehension.     Cranial Nerves   Cranial nerves II through XII intact.     Motor Exam   Muscle bulk: normal  Overall muscle tone: normal    Strength   Strength 5/5 throughout.     Sensory Exam   Light touch normal.     Gait, Coordination, and Reflexes     Coordination   Finger to nose coordination: normal    Tremor   Resting tremor: absent          Assessment/Plan   Radha was seen today for follow-up.    Diagnoses and all orders for this visit:    Migraine without aura and without status migrainosus, not intractable          Discussion/Summary   Radha Stafford returns to clinic today with a history of migraines. I again reviewed her current status and treatment options. After discussing potential treatment options, it was elected to continue on Ajovy and Effexor XR unchanged.She will then follow up in 6 months, or sooner if needed.   I spent 15 minutes face to face with the patient with 10 minutes spent on discussing diagnosis, evaluation, current status, treatment options and management as discussed above.       As part of this visit I discussed the history with the patient .      Fifi Mcmahon PA-C

## 2019-07-26 RX ORDER — VENLAFAXINE HYDROCHLORIDE 75 MG/1
CAPSULE, EXTENDED RELEASE ORAL
Qty: 30 CAPSULE | Refills: 1 | Status: SHIPPED | OUTPATIENT
Start: 2019-07-26 | End: 2019-09-05 | Stop reason: SDUPTHER

## 2019-09-06 RX ORDER — VENLAFAXINE HYDROCHLORIDE 75 MG/1
CAPSULE, EXTENDED RELEASE ORAL
Qty: 30 CAPSULE | Refills: 1 | Status: SHIPPED | OUTPATIENT
Start: 2019-09-06 | End: 2019-11-29 | Stop reason: SDUPTHER

## 2019-12-02 RX ORDER — VENLAFAXINE HYDROCHLORIDE 75 MG/1
CAPSULE, EXTENDED RELEASE ORAL
Qty: 30 CAPSULE | Refills: 1 | Status: SHIPPED | OUTPATIENT
Start: 2019-12-02 | End: 2020-01-27

## 2019-12-06 ENCOUNTER — OFFICE VISIT (OUTPATIENT)
Dept: NEUROLOGY | Facility: CLINIC | Age: 59
End: 2019-12-06

## 2019-12-06 VITALS — WEIGHT: 160 LBS | BODY MASS INDEX: 23.7 KG/M2 | HEIGHT: 69 IN

## 2019-12-06 DIAGNOSIS — G43.009 MIGRAINE WITHOUT AURA AND WITHOUT STATUS MIGRAINOSUS, NOT INTRACTABLE: Primary | ICD-10-CM

## 2019-12-06 PROCEDURE — 99213 OFFICE O/P EST LOW 20 MIN: CPT | Performed by: PHYSICIAN ASSISTANT

## 2019-12-06 NOTE — PROGRESS NOTES
"Subjective     Chief Complaint: headaches     History of Present Illness   Radha Stafford is a 59 y.o. female who returns to clinic today for evaluation of migraines. She has noted symptoms for at least several years marked initially by a left sided throbbing headache. Her symptoms have worsened since 7/17 and are now daily. Her headaches typically occur in the evening and last for several hours. She notes associated nausea and photosensitivity. She denies any associated vomiting or visual changes. Her headaches are worse with increased stress.       She also notes a head described as a band of pressure around her head which is worse with increased stress.       Prior evaluation has included an MRI of the brain, which was reportedly unremarkable. She is taking Ajovy and Effexor XR 75mg as well as Maxalt as needed. She has tried aimovig, Qudexy XR 50mg BID, TPM, amitriptyline and zonisamide, though these were not tolerated well.     Today: Since her last visit in 6/19, she        I have reviewed and confirmed the past family, social and medical history as accurate on 12/6/19.     Review of Systems   Constitutional: Negative.    Eyes: Negative.    Respiratory: Negative.    Cardiovascular: Negative.    Gastrointestinal: Negative.    Endocrine: Negative.    Genitourinary: Negative.    Musculoskeletal: Negative.    Skin: Negative.    Allergic/Immunologic: Negative.    Neurological: Positive for headaches.   Hematological: Negative.    Psychiatric/Behavioral: Negative.        Objective     Ht 175.3 cm (69\")   Wt 72.6 kg (160 lb)   BMI 23.63 kg/m²     General appearance today is normal.     Physical Exam   Neurological: She has normal strength. She has a normal Finger-Nose-Finger Test. Gait normal.   Psychiatric: Her speech is normal.        Neurologic Exam     Mental Status   Speech: speech is normal   Level of consciousness: alert  Normal comprehension.     Cranial Nerves   Cranial nerves II through XII intact.     Motor " Exam   Muscle bulk: normal  Overall muscle tone: normal    Strength   Strength 5/5 throughout.     Sensory Exam   Light touch normal.     Gait, Coordination, and Reflexes     Gait  Gait: normal    Coordination   Finger to nose coordination: normal    Tremor   Resting tremor: absent        Assessment/Plan   Radha was seen today for migraine.    Diagnoses and all orders for this visit:    Migraine without aura and without status migrainosus, not intractable          Discussion/Summary   Radha Stafford returns to clinic today with a history of migraines. I again reviewed her current status and treatment options. After discussing potential treatment options, it was elected to continue on Ajovy and Effexor XR unchanged. She will then follow up in 1 year, or sooner if needed.   I spent 15 minutes face to face with the patient with 8 minutes spent on discussing diagnosis, prognosis, evaluation, current status, treatment options and management as discussed above.       As part of this visit I discussed the history with the patient .      Fifi Mcmahon PA-C

## 2020-01-27 RX ORDER — VENLAFAXINE HYDROCHLORIDE 75 MG/1
CAPSULE, EXTENDED RELEASE ORAL
Qty: 30 CAPSULE | Refills: 1 | Status: SHIPPED | OUTPATIENT
Start: 2020-01-27 | End: 2020-03-11

## 2020-03-11 RX ORDER — VENLAFAXINE HYDROCHLORIDE 75 MG/1
CAPSULE, EXTENDED RELEASE ORAL
Qty: 30 CAPSULE | Refills: 1 | Status: SHIPPED | OUTPATIENT
Start: 2020-03-11 | End: 2020-05-15

## 2020-05-05 RX ORDER — FREMANEZUMAB-VFRM 225 MG/1.5ML
INJECTION SUBCUTANEOUS
Qty: 1.5 ML | Refills: 11 | Status: SHIPPED | OUTPATIENT
Start: 2020-05-05 | End: 2021-03-17 | Stop reason: SDUPTHER

## 2020-05-15 RX ORDER — VENLAFAXINE HYDROCHLORIDE 75 MG/1
CAPSULE, EXTENDED RELEASE ORAL
Qty: 30 CAPSULE | Refills: 1 | Status: SHIPPED | OUTPATIENT
Start: 2020-05-15 | End: 2020-06-03

## 2020-06-03 RX ORDER — VENLAFAXINE HYDROCHLORIDE 75 MG/1
CAPSULE, EXTENDED RELEASE ORAL
Qty: 30 CAPSULE | Refills: 1 | Status: SHIPPED | OUTPATIENT
Start: 2020-06-03 | End: 2020-10-13

## 2020-10-13 DIAGNOSIS — G43.009 MIGRAINE WITHOUT AURA AND WITHOUT STATUS MIGRAINOSUS, NOT INTRACTABLE: Primary | ICD-10-CM

## 2020-10-13 RX ORDER — VENLAFAXINE HYDROCHLORIDE 75 MG/1
CAPSULE, EXTENDED RELEASE ORAL
Qty: 30 CAPSULE | Refills: 1 | Status: SHIPPED | OUTPATIENT
Start: 2020-10-13 | End: 2020-12-14

## 2020-12-12 DIAGNOSIS — G43.009 MIGRAINE WITHOUT AURA AND WITHOUT STATUS MIGRAINOSUS, NOT INTRACTABLE: ICD-10-CM

## 2020-12-14 RX ORDER — VENLAFAXINE HYDROCHLORIDE 75 MG/1
CAPSULE, EXTENDED RELEASE ORAL
Qty: 30 CAPSULE | Refills: 1 | Status: SHIPPED | OUTPATIENT
Start: 2020-12-14 | End: 2021-02-24 | Stop reason: SDUPTHER

## 2021-02-24 DIAGNOSIS — G43.009 MIGRAINE WITHOUT AURA AND WITHOUT STATUS MIGRAINOSUS, NOT INTRACTABLE: ICD-10-CM

## 2021-02-24 RX ORDER — VENLAFAXINE HYDROCHLORIDE 75 MG/1
75 CAPSULE, EXTENDED RELEASE ORAL DAILY
Qty: 30 CAPSULE | Refills: 3 | Status: SHIPPED | OUTPATIENT
Start: 2021-02-24 | End: 2021-03-17

## 2021-03-17 ENCOUNTER — OFFICE VISIT (OUTPATIENT)
Dept: NEUROLOGY | Facility: CLINIC | Age: 61
End: 2021-03-17

## 2021-03-17 VITALS
BODY MASS INDEX: 24.14 KG/M2 | HEART RATE: 71 BPM | SYSTOLIC BLOOD PRESSURE: 130 MMHG | WEIGHT: 163 LBS | TEMPERATURE: 97.7 F | HEIGHT: 69 IN | OXYGEN SATURATION: 100 % | DIASTOLIC BLOOD PRESSURE: 82 MMHG

## 2021-03-17 DIAGNOSIS — G43.009 MIGRAINE WITHOUT AURA AND WITHOUT STATUS MIGRAINOSUS, NOT INTRACTABLE: Primary | ICD-10-CM

## 2021-03-17 PROCEDURE — 99213 OFFICE O/P EST LOW 20 MIN: CPT | Performed by: PHYSICIAN ASSISTANT

## 2021-03-17 RX ORDER — IBUPROFEN 600 MG/1
TABLET ORAL
COMMUNITY
Start: 2021-01-05

## 2021-03-17 RX ORDER — VENLAFAXINE HYDROCHLORIDE 37.5 MG/1
37.5 CAPSULE, EXTENDED RELEASE ORAL DAILY
Qty: 30 CAPSULE | Refills: 11 | Status: SHIPPED | OUTPATIENT
Start: 2021-03-17 | End: 2022-03-16 | Stop reason: SDUPTHER

## 2021-03-17 RX ORDER — FREMANEZUMAB-VFRM 225 MG/1.5ML
225 INJECTION SUBCUTANEOUS
Qty: 1.5 ML | Refills: 11 | Status: SHIPPED | OUTPATIENT
Start: 2021-03-17 | End: 2021-05-17 | Stop reason: SDUPTHER

## 2021-03-17 NOTE — PROGRESS NOTES
"Subjective         Chief Complaint: headaches      History of Present Illness   Radha Stafford is a 60 y.o. female who returns to clinic today for evaluation of migraines. She has noted symptoms for at least several years marked initially by a left sided throbbing headache. Her symptoms have worsened since 7/17 and are now daily. Her headaches typically occur in the evening and last for several hours. She notes associated nausea and photosensitivity. She denies any associated vomiting or visual changes. Her headaches are worse with increased stress.       She also notes a head described as a band of pressure around her head which is worse with increased stress.       Prior evaluation has included an MRI of the brain, which was reportedly unremarkable. She is taking Ajovy and Effexor XR 75mg as well as Maxalt as needed. She has tried aimovig, Qudexy XR 50mg BID, TPM, amitriptyline and zonisamide, though these were not tolerated well.     Today: Since her last visit in 12/19, she notes that her headaches continue to be improved.      I have reviewed and confirmed the past family, social and medical history as accurate on 3/17/21.     Review of Systems   Constitutional: Negative.    HENT: Negative.    Eyes: Negative.    Respiratory: Negative.    Cardiovascular: Negative.    Gastrointestinal: Negative.    Endocrine: Negative.    Genitourinary: Negative.    Musculoskeletal: Negative.    Skin: Negative.    Allergic/Immunologic: Negative.    Hematological: Negative.    Psychiatric/Behavioral: Negative.        Objective     /82   Pulse 71   Temp 97.7 °F (36.5 °C)   Ht 175.3 cm (69\")   Wt 73.9 kg (163 lb)   SpO2 100%   Breastfeeding No   BMI 24.07 kg/m²     General appearance today is normal.     Physical Exam  Neurological:      Coordination: Finger-Nose-Finger Test normal.      Gait: Gait is intact.   Psychiatric:         Speech: Speech normal.          Neurologic Exam     Mental Status   Speech: speech is " normal   Level of consciousness: alert  Normal comprehension.     Cranial Nerves   Cranial nerves II through XII intact.     Motor Exam   Muscle bulk: normal    Gait, Coordination, and Reflexes     Gait  Gait: normal    Coordination   Finger to nose coordination: normal    Tremor   Resting tremor: absent          Assessment/Plan   Diagnoses and all orders for this visit:    1. Migraine without aura and without status migrainosus, not intractable (Primary)    Other orders  -     venlafaxine XR (Effexor XR) 37.5 MG 24 hr capsule; Take 1 capsule by mouth Daily.  Dispense: 30 capsule; Refill: 11  -     Fremanezumab-vfrm (Ajovy) 225 MG/1.5ML solution prefilled syringe; Inject 225 mg under the skin into the appropriate area as directed Every 30 (Thirty) Days.  Dispense: 1.5 mL; Refill: 11          Discussion/Summary   Radha Stafford returns to clinic today with a history of migraines. I again reviewed her current status and treatment options. After discussing potential treatment options, it was elected to continue on Ajovy and decrease her Effexor XR to 37.5mg daily. She will then follow up in 1 year, or sooner if needed.   I spent 15 minutes face to face with the patient with 10 minutes spent on discussing diagnosis, evaluation, current status, treatment options and management as discussed above.     As part of this visit I discussed the history with the patient .      Fifi Mcmahon PA-C

## 2021-05-17 ENCOUNTER — TELEPHONE (OUTPATIENT)
Dept: NEUROLOGY | Facility: CLINIC | Age: 61
End: 2021-05-17

## 2021-05-17 RX ORDER — FREMANEZUMAB-VFRM 225 MG/1.5ML
225 INJECTION SUBCUTANEOUS
Qty: 1.5 ML | Refills: 11 | Status: SHIPPED | OUTPATIENT
Start: 2021-05-17 | End: 2022-03-16 | Stop reason: SDUPTHER

## 2021-05-17 NOTE — TELEPHONE ENCOUNTER
ROZ RAY  [phone]    THE PT JUST WANTED TO TELL SHUBHAM NOT TO WORRY ABOUT GETTING THE LETTER FOR HER AJOVY.   Detail Level: Detailed

## 2021-05-17 NOTE — TELEPHONE ENCOUNTER
Provider: COLLETTE VALADEZ     Caller: ROZ RAY     Relationship to Patient: SELF    Pharmacy: NA    Phone Number: [phone]    Reason for Call:   THE PATIENT CALLED BECAUSE SHE STATES HER INSURANCE PRICE HAS INCREASED, AND IN ORDER FOR HER TO MAINTAIN A DECENT PRICE THEY ARE ASKING FOR A LETTER EXPLAINING WHY SHE TAKE THE MEDICATION- Fremanezumab-vfrm (Ajovy) 225 MG/1.5ML solution prefilled syringe      PLEASE ADVISE.

## 2021-05-17 NOTE — TELEPHONE ENCOUNTER
She stated that she did not receive a direct letter from her insurance company. She stated that her insurance premium has went up roughly $400 and her agent seems to think it is related to her being on Ajovy. She has emailed me a copy of the letter her agent received. I will place it in your office.

## 2022-01-18 ENCOUNTER — PRIOR AUTHORIZATION (OUTPATIENT)
Dept: NEUROLOGY | Facility: CLINIC | Age: 62
End: 2022-01-18

## 2022-01-18 NOTE — TELEPHONE ENCOUNTER
- Follow up in 6 months for IOP check with [facility] prior PA for David   Status: Approved  Coverage Starts on: 1/18/2022  Coverage Ends on: 1/18/2023

## 2022-03-16 ENCOUNTER — OFFICE VISIT (OUTPATIENT)
Dept: NEUROLOGY | Facility: CLINIC | Age: 62
End: 2022-03-16

## 2022-03-16 VITALS
DIASTOLIC BLOOD PRESSURE: 64 MMHG | WEIGHT: 156 LBS | HEART RATE: 72 BPM | HEIGHT: 69 IN | BODY MASS INDEX: 23.11 KG/M2 | SYSTOLIC BLOOD PRESSURE: 118 MMHG | OXYGEN SATURATION: 94 %

## 2022-03-16 DIAGNOSIS — G43.009 MIGRAINE WITHOUT AURA AND WITHOUT STATUS MIGRAINOSUS, NOT INTRACTABLE: Primary | ICD-10-CM

## 2022-03-16 PROCEDURE — 99213 OFFICE O/P EST LOW 20 MIN: CPT | Performed by: PHYSICIAN ASSISTANT

## 2022-03-16 RX ORDER — HYDROCHLOROTHIAZIDE 25 MG/1
TABLET ORAL
COMMUNITY
Start: 2021-12-18

## 2022-03-16 RX ORDER — FREMANEZUMAB-VFRM 225 MG/1.5ML
225 INJECTION SUBCUTANEOUS
Qty: 1.5 ML | Refills: 11 | Status: SHIPPED | OUTPATIENT
Start: 2022-03-16 | End: 2023-04-03 | Stop reason: SDUPTHER

## 2022-03-16 RX ORDER — VENLAFAXINE HYDROCHLORIDE 37.5 MG/1
37.5 CAPSULE, EXTENDED RELEASE ORAL DAILY
Qty: 30 CAPSULE | Refills: 11 | Status: SHIPPED | OUTPATIENT
Start: 2022-03-16 | End: 2023-03-16

## 2022-03-16 NOTE — PROGRESS NOTES
"Subjective       Chief Complaint: headaches      History of Present Illness   Radha Stafford is a 61 y.o. female who returns to clinic today for evaluation of migraines. She has noted symptoms for at least several years marked initially by a left sided throbbing headache. Her symptoms have worsened since 7/17 and are now daily. Her headaches typically occur in the evening and last for several hours. She notes associated nausea and photosensitivity. She denies any associated vomiting or visual changes. Her headaches are worse with increased stress.       She also notes a head described as a band of pressure around her head which is worse with increased stress.       Prior evaluation has included an MRI of the brain, which was reportedly unremarkable. She is taking Ajovy and Effexor XR 37.5mg as well as Maxalt as needed. She has tried aimovig, Qudexy XR 50mg BID, TPM, amitriptyline and zonisamide, though these were not tolerated well.     Today: Since her last visit in 3/21, she notes that her headaches continue to be very manageable and well controlled.       I have reviewed and confirmed the past family, social and medical history as accurate on 3/16/22.     Review of Systems   Constitutional: Negative.    HENT: Negative.    Eyes: Negative.    Respiratory: Negative.    Cardiovascular: Negative.    Gastrointestinal: Negative.    Endocrine: Negative.    Genitourinary: Negative.    Musculoskeletal: Negative.    Skin: Negative.    Allergic/Immunologic: Negative.    Neurological:            Hematological: Negative.        Objective     /64   Pulse 72   Ht 175.3 cm (69\")   Wt 70.8 kg (156 lb)   SpO2 94%   BMI 23.04 kg/m²     General appearance today is normal.       Physical Exam  Neurological:      Gait: Gait is intact.   Psychiatric:         Speech: Speech normal.          Neurologic Exam     Mental Status   Speech: speech is normal   Level of consciousness: alert  Normal comprehension.     Cranial Nerves "   Cranial nerves II through XII intact.     Motor Exam   Muscle bulk: normal    Gait, Coordination, and Reflexes     Gait  Gait: normal    Tremor   Resting tremor: absent        Assessment/Plan   Diagnoses and all orders for this visit:    1. Migraine without aura and without status migrainosus, not intractable (Primary)    Other orders  -     venlafaxine XR (Effexor XR) 37.5 MG 24 hr capsule; Take 1 capsule by mouth Daily.  Dispense: 30 capsule; Refill: 11  -     Fremanezumab-vfrm (Ajovy) 225 MG/1.5ML solution prefilled syringe; Inject 225 mg under the skin into the appropriate area as directed Every 30 (Thirty) Days.  Dispense: 1.5 mL; Refill: 11          Discussion/Summary   Radha Stafford returns to clinic today for evaluation of migraines. I again reviewed her current status and treatment options. After discussing potential treatment options, it was elected to continue on her other medications unchanged as she is doing well overall. She will then follow up in 1 year, or sooner if needed.   Total time of visit today: 20 minutes. As part of this visit I discussed the history with the patient . I also discussed diagnosis, evaluation, current status, treatment options and management as discussed above.         Fifi Mcmahon PA-C

## 2022-07-20 NOTE — TELEPHONE ENCOUNTER
Discharge instructions given to patient. No questions at this time. New medication with patient. IVs taken out and in tact. Tele removed and returned.   PAMELA Hernandez sent to plan

## 2022-12-21 ENCOUNTER — PRIOR AUTHORIZATION (OUTPATIENT)
Dept: NEUROLOGY | Facility: CLINIC | Age: 62
End: 2022-12-21

## 2023-04-03 ENCOUNTER — TELEPHONE (OUTPATIENT)
Dept: NEUROLOGY | Facility: CLINIC | Age: 63
End: 2023-04-03
Payer: COMMERCIAL

## 2023-04-03 RX ORDER — FREMANEZUMAB-VFRM 225 MG/1.5ML
225 INJECTION SUBCUTANEOUS
Qty: 1.5 ML | Refills: 11 | Status: SHIPPED | OUTPATIENT
Start: 2023-04-03

## 2023-04-03 NOTE — TELEPHONE ENCOUNTER
Caller: Radha Stafford DINO    Relationship: Self    Best call back number: [phone]    Requested Prescriptions:   Requested Prescriptions     Pending Prescriptions Disp Refills   • Fremanezumab-vfrm (Ajovy) 225 MG/1.5ML solution prefilled syringe 1.5 mL 11     Sig: Inject 225 mg under the skin into the appropriate area as directed Every 30 (Thirty) Days.        Pharmacy where request should be sent: [facility] #[id number] - [place], [place] - [address] [place] [address] [phone] [place] [phone]      Last office visit with prescribing clinician: Visit date not found   Last telemedicine visit with prescribing clinician: 2023   Next office visit with prescribing clinician: 2023     Additional details provided by patient: PT STATED HER PRESCRIPTION FOR AJOVY HAS .   STATED SHE IS DUE TODAY OR TOMORROW TO TAKE THE MEDICATION.    Does the patient have less than a 3 day supply:  [x] Yes  [] No    Would you like a call back once the refill request has been completed: [] Yes [] No    If the office needs to give you a call back, can they leave a voicemail: [] Yes [] No    Prabhjot Lima Rep   23 09:29 EDT

## 2023-04-03 NOTE — TELEPHONE ENCOUNTER
Rx Refill Note  Requested Prescriptions     Pending Prescriptions Disp Refills   • Fremanezumab-vfrm (Ajovy) 225 MG/1.5ML solution prefilled syringe 1.5 mL 11     Sig: Inject 225 mg under the skin into the appropriate area as directed Every 30 (Thirty) Days.      Last filled:03/16/2022. Sent  Last office visit with prescribing clinician: Visit date not found      Next office visit with prescribing clinician: 5/18/2023     Ronal Cortes MA  04/03/23, 14:36 EDT

## 2023-04-04 ENCOUNTER — PRIOR AUTHORIZATION (OUTPATIENT)
Dept: NEUROLOGY | Facility: CLINIC | Age: 63
End: 2023-04-04
Payer: COMMERCIAL

## 2023-04-14 NOTE — TELEPHONE ENCOUNTER
MEDICATION REFILL REQUEST    Caller: Radha Stafford    Relationship: Self    Best call back number: [phone]    Requested Prescriptions:   Requested Prescriptions     Pending Prescriptions Disp Refills   • venlafaxine XR (Effexor XR) 37.5 MG 24 hr capsule 30 capsule 11     Sig: Take 1 capsule by mouth Daily.      Pharmacy where request should be sent: [address] [phone] [facility] - [phone]     Last office visit with prescribing clinician: 3/16/2022 W/ COLLETTE VALADEZ PA-C  Next office visit with prescribing clinician: 5/18/2023     Additional details provided by patient: PT STATES HAS ONLY A COUPLE OF DAYS OF MEDICATION LEFT.    Does the patient have less than a 3 day supply?:  [x] Yes  [] No    Would you like a call back once the refill request has been completed?: [] Yes  [x] No    If the office needs to give you a call back, can they leave a voicemail?: [] Yes  [x] No    PLEASE REVIEW AND ADVISE.    Prabhjot Mancilla Rep   04/14/23 15:31 EDT

## 2023-04-17 RX ORDER — VENLAFAXINE HYDROCHLORIDE 37.5 MG/1
37.5 CAPSULE, EXTENDED RELEASE ORAL DAILY
Qty: 30 CAPSULE | Refills: 11 | Status: SHIPPED | OUTPATIENT
Start: 2023-04-17 | End: 2024-04-16

## 2023-04-17 NOTE — TELEPHONE ENCOUNTER
Rx Refill Note  Requested Prescriptions     Pending Prescriptions Disp Refills   • venlafaxine XR (Effexor XR) 37.5 MG 24 hr capsule 30 capsule 11     Sig: Take 1 capsule by mouth Daily.      Last filled: 03/16/2022 with 11  Last office visit with prescribing clinician: 03/16/2022  Next office visit with prescribing clinician: 5/18/2023     Prabhjot Bridges Rep  04/17/23, 13:53 EDT

## 2023-05-18 ENCOUNTER — OFFICE VISIT (OUTPATIENT)
Dept: NEUROLOGY | Facility: CLINIC | Age: 63
End: 2023-05-18
Payer: COMMERCIAL

## 2023-05-18 VITALS
BODY MASS INDEX: 23.52 KG/M2 | SYSTOLIC BLOOD PRESSURE: 124 MMHG | HEIGHT: 69 IN | WEIGHT: 158.8 LBS | DIASTOLIC BLOOD PRESSURE: 86 MMHG | OXYGEN SATURATION: 99 % | HEART RATE: 67 BPM

## 2023-05-18 DIAGNOSIS — G43.009 MIGRAINE WITHOUT AURA AND WITHOUT STATUS MIGRAINOSUS, NOT INTRACTABLE: Primary | ICD-10-CM

## 2023-05-18 NOTE — PROGRESS NOTES
Subjective:    CC: Radha Stafford is seen today in consultation at the request of PAMELA Fan for Migraine       HPI:  62-year-old female with a history of hypertension, anxiety presents with headaches.  As per patient she has had headaches all her life but they were not very severe.  However around 2016 she got stung by a wasp where she had an anaphylactic reaction following which she started to have severe headaches mainly on both sides of her head, triggered by certain smells, accompanied by nausea, occasional vomiting, photophobia and phonophobia.  She would also get confused during and after a migraine.  She tried several different medications in the past including Topamax, amitriptyline, zonisamide, Qudexy and Aimovig.  A few years ago she started Ajovy shots in addition to low doses of Effexor XR 37.5 mg daily that has helped tremendously.  She has not had a migraine headache in about 4 years.  Does have occasional dull headaches that are provoked by strong smells such as perfumes.  There is a family history of migraines in her grandmother and her father.  She had a MRI brain in 2018 that showed slightly low-lying cerebellar tonsils with minimal chronic ischemic changes but no acute intracranial abnormalities.  Of note-I personally reviewed her MRI brain and Fifi's notes    The following portions of the patient's history were reviewed today and updated as of 05/18/2023  : allergies, current medications, past family history, past medical history, past social history, past surgical history and problem list  These document will be scanned to patient's chart.      Current Outpatient Medications:   •  EPIPEN 2-ISABELL 0.3 MG/0.3ML solution auto-injector injection, , Disp: , Rfl:   •  fluticasone (FLONASE) 50 MCG/ACT nasal spray, 1 SPRAY EACH NOSTRIL TWICE DAILY. USE TOGETHER WITH AZELASTINE NASAL SPRAY., Disp: , Rfl: 5  •  Fremanezumab-vfrm (Ajovy) 225 MG/1.5ML solution prefilled syringe, Inject 225 mg under the  "skin into the appropriate area as directed Every 30 (Thirty) Days., Disp: 1.5 mL, Rfl: 11  •  hydroCHLOROthiazide (HYDRODIURIL) 25 MG tablet, TAKE ONE-HALF TABLET BY MOUTH ONE TIME EVERY DAY, Disp: , Rfl:   •  ibuprofen (ADVIL,MOTRIN) 600 MG tablet, , Disp: , Rfl:   •  venlafaxine XR (Effexor XR) 37.5 MG 24 hr capsule, Take 1 capsule by mouth Daily., Disp: 30 capsule, Rfl: 11   Past Medical History:   Diagnosis Date   • Cancer    • Hypertension    • Migraine       History reviewed. No pertinent surgical history.   Family History   Problem Relation Age of Onset   • Cancer Father    • Hypertension Father    • Migraines Father    • Heart disease Maternal Grandfather       Social History     Socioeconomic History   • Marital status:    Tobacco Use   • Smoking status: Never     Passive exposure: Never   • Smokeless tobacco: Never   Vaping Use   • Vaping Use: Never used   Substance and Sexual Activity   • Alcohol use: Yes     Alcohol/week: 5.0 standard drinks     Types: 5 Glasses of wine per week     Comment: social   • Drug use: Never   • Sexual activity: Yes     Partners: Male     Birth control/protection: Post-menopausal     Review of Systems   Neurological: Positive for headache.   All other systems reviewed and are negative.      Objective:    /86   Pulse 67   Ht 175.3 cm (69\")   Wt 72 kg (158 lb 12.8 oz)   SpO2 99%   BMI 23.45 kg/m²     Neurology Exam:    General apperance: NAD.     Mental status: Alert, awake and oriented to time place and person.    Recent and Remote memory: Intact.    Attention span and Concentration: Normal.     Language and Speech: Intact- No dysarthria.    Fluency, Naming , Repitition and Comprehension:  Intact    Cranial Nerves:   CN II: Visual fields are full. Intact. Fundi - Normal, No papillederma, Pupils - ALEX  CN III, IV and VI: Extraocular movements are intact. Normal saccades.   CN V: Facial sensation is intact.   CN VII: Muscles of facial expression reveal no " asymmetry. Intact.   CN VIII: Hearing is intact. Whispered voice intact.   CN IX and X: Palate elevates symmetrically. Intact  CN XI: Shoulder shrug is intact.   CN XII: Tongue is midline without evidence of atrophy or fasciculation.     Ophthalmoscopic exam of optic disc-normal    Motor:  Right UE muscle strength 5/5. Normal tone.     Left UE muscle strength 5/5. Normal tone.      Right LE muscle strength5/5. Normal tone.     Left LE muscle strength 5/5. Normal tone.      Sensory: Normal light touch, vibration and pinprick sensation bilaterally.    DTRs: 2+ bilaterally in upper and lower extremities.    Babinski: Negative bilaterally.    Co-ordination: Normal finger-to-nose, heel to shin B/L.    Rhomberg: Negative.    Gait: Normal.  Could do tandem walking    Cardiovascular: Regular rate and rhythm without murmur, gallop or rub.    Assessment and Plan:  1. Migraine without aura and without status migrainosus, not intractable  Patient has a history of complex migraines causing mild confusion.  MRI brain showed slightly low-lying cerebellar tonsils but no acute abnormalities.  In the past she has tried Topamax, Qudexy, amitriptyline, zonisamide and Aimovig  I have asked her to continue monthly Ajovy shots and Effexor XR 37.5 mg daily that also helps with her anxiety  She was previously using Maxalt as needed but has not had to use it for the past 4 years  She should keep her self well-hydrated    Return in about 1 year (around 5/18/2024).     I spent over 40 minutes with the patient face to face out of which over 50% (25 minutes) was spent in management, instructions and education.     Catherine Mccauley MD

## 2024-04-01 RX ORDER — FREMANEZUMAB-VFRM 225 MG/1.5ML
225 INJECTION SUBCUTANEOUS
Qty: 4.5 EACH | Refills: 3 | Status: SHIPPED | OUTPATIENT
Start: 2024-04-01

## 2024-05-09 RX ORDER — VENLAFAXINE HYDROCHLORIDE 37.5 MG/1
CAPSULE, EXTENDED RELEASE ORAL
Qty: 30 CAPSULE | Refills: 0 | Status: SHIPPED | OUTPATIENT
Start: 2024-05-09

## 2024-05-20 ENCOUNTER — PATIENT MESSAGE (OUTPATIENT)
Dept: NEUROLOGY | Facility: CLINIC | Age: 64
End: 2024-05-20

## 2024-05-20 ENCOUNTER — OFFICE VISIT (OUTPATIENT)
Dept: NEUROLOGY | Facility: CLINIC | Age: 64
End: 2024-05-20
Payer: COMMERCIAL

## 2024-05-20 VITALS
HEIGHT: 69 IN | WEIGHT: 152.8 LBS | SYSTOLIC BLOOD PRESSURE: 122 MMHG | OXYGEN SATURATION: 97 % | DIASTOLIC BLOOD PRESSURE: 94 MMHG | HEART RATE: 70 BPM | BODY MASS INDEX: 22.63 KG/M2

## 2024-05-20 DIAGNOSIS — G43.009 MIGRAINE WITHOUT AURA AND WITHOUT STATUS MIGRAINOSUS, NOT INTRACTABLE: Primary | ICD-10-CM

## 2024-05-20 PROCEDURE — 99213 OFFICE O/P EST LOW 20 MIN: CPT | Performed by: PSYCHIATRY & NEUROLOGY

## 2024-05-20 RX ORDER — FREMANEZUMAB-VFRM 225 MG/1.5ML
225 INJECTION SUBCUTANEOUS
Qty: 4.5 EACH | Refills: 3 | Status: SHIPPED | OUTPATIENT
Start: 2024-05-20

## 2024-05-20 RX ORDER — ALBUTEROL SULFATE 90 UG/1
AEROSOL, METERED RESPIRATORY (INHALATION) EVERY 8 HOURS
COMMUNITY

## 2024-05-20 NOTE — PROGRESS NOTES
Subjective:    CC: Radha Stafford is seen today for Migraine without aura and without status migrainosus, not i       Current visit-patient states that her headaches remain extremely well-controlled.  In fact she has not had a migraine headache for about 5 years now.  Has a few dull headaches each month for which she tries not to take any over-the-counter medications.  Continues to take monthly Ajovy shots in addition to low doses of Effexor.    Initial rvkas-85-unif-old female with a history of hypertension, anxiety presents with headaches.  As per patient she has had headaches all her life but they were not very severe.  However around 2016 she got stung by a wasp where she had an anaphylactic reaction following which she started to have severe headaches mainly on both sides of her head, triggered by certain smells, accompanied by nausea, occasional vomiting, photophobia and phonophobia.  She would also get confused during and after a migraine.  She tried several different medications in the past including Topamax, amitriptyline, zonisamide, Qudexy and Aimovig.  A few years ago she started Ajovy shots in addition to low doses of Effexor XR 37.5 mg daily that has helped tremendously.  She has not had a migraine headache in about 4 years.  Does have occasional dull headaches that are provoked by strong smells such as perfumes.  There is a family history of migraines in her grandmother and her father.  She had a MRI brain in 2018 that showed slightly low-lying cerebellar tonsils with minimal chronic ischemic changes but no acute intracranial abnormalities.  Of note-I personally reviewed her MRI brain and Fifi's notes    The following portions of the patient's history were reviewed today and updated as of 05/18/2023  : allergies, current medications, past family history, past medical history, past social history, past surgical history and problem list  These document will be scanned to patient's chart.      Current  "Outpatient Medications:     albuterol sulfate HFA (Ventolin HFA) 108 (90 Base) MCG/ACT inhaler, Every 8 (Eight) Hours., Disp: , Rfl:     EPIPEN 2-ISABELL 0.3 MG/0.3ML solution auto-injector injection, , Disp: , Rfl:     fluticasone (FLONASE) 50 MCG/ACT nasal spray, 1 SPRAY EACH NOSTRIL TWICE DAILY. USE TOGETHER WITH AZELASTINE NASAL SPRAY., Disp: , Rfl: 5    Fremanezumab-vfrm (Ajovy) 225 MG/1.5ML solution prefilled syringe, Inject 225 mg under the skin into the appropriate area as directed Every 30 (Thirty) Days., Disp: 4.5 each, Rfl: 3    hydroCHLOROthiazide (HYDRODIURIL) 25 MG tablet, TAKE ONE-HALF TABLET BY MOUTH ONE TIME EVERY DAY, Disp: , Rfl:     ibuprofen (ADVIL,MOTRIN) 600 MG tablet, , Disp: , Rfl:     venlafaxine XR (EFFEXOR-XR) 37.5 MG 24 hr capsule, TAKE 1 CAPSULE BY MOUTH DAILY. -TAKE WITH FOOD, Disp: 30 capsule, Rfl: 0   Past Medical History:   Diagnosis Date    Cancer     Hypertension     Migraine       No past surgical history on file.   Family History   Problem Relation Age of Onset    Cancer Father     Hypertension Father     Migraines Father     Heart disease Maternal Grandfather       Social History     Socioeconomic History    Marital status:    Tobacco Use    Smoking status: Never     Passive exposure: Never    Smokeless tobacco: Never   Vaping Use    Vaping status: Never Used   Substance and Sexual Activity    Alcohol use: Yes     Alcohol/week: 5.0 standard drinks of alcohol     Types: 5 Glasses of wine per week     Comment: social    Drug use: Never    Sexual activity: Yes     Partners: Male     Birth control/protection: Post-menopausal     Review of Systems   Neurological:  Positive for headache.   All other systems reviewed and are negative.      Objective:    /94   Pulse 70   Ht 175.3 cm (69\")   Wt 69.3 kg (152 lb 12.8 oz)   SpO2 97%   BMI 22.56 kg/m²     Neurology Exam:    General apperance: NAD.     Mental status: Alert, awake and oriented to time place and person.    Recent " and Remote memory: Intact.    Attention span and Concentration: Normal.     Language and Speech: Intact- No dysarthria.    Fluency, Naming , Repitition and Comprehension:  Intact    Cranial Nerves:   CN II: Visual fields are full. Intact. Fundi - Normal, No papillederma, Pupils - ALEX  CN III, IV and VI: Extraocular movements are intact. Normal saccades.   CN V: Facial sensation is intact.   CN VII: Muscles of facial expression reveal no asymmetry. Intact.   CN VIII: Hearing is intact. Whispered voice intact.   CN IX and X: Palate elevates symmetrically. Intact  CN XI: Shoulder shrug is intact.   CN XII: Tongue is midline without evidence of atrophy or fasciculation.     Ophthalmoscopic exam of optic disc-normal    Motor:  Right UE muscle strength 5/5. Normal tone.     Left UE muscle strength 5/5. Normal tone.      Right LE muscle strength5/5. Normal tone.     Left LE muscle strength 5/5. Normal tone.      Sensory: Normal light touch, vibration and pinprick sensation bilaterally.    DTRs: 2+ bilaterally in upper and lower extremities.    Babinski: Negative bilaterally.    Co-ordination: Normal finger-to-nose, heel to shin B/L.    Rhomberg: Negative.    Gait: Normal.  Could do tandem walking    Cardiovascular: Regular rate and rhythm without murmur, gallop or rub.    Assessment and Plan:  1. Migraine without aura and without status migrainosus, not intractable  Patient has a history of complex migraines causing mild confusion.  MRI brain showed slightly low-lying cerebellar tonsils but no acute abnormalities.  In the past she has tried Topamax, Qudexy, amitriptyline, zonisamide and Aimovig  I have asked her to continue monthly Ajovy shots and Effexor XR 37.5 mg daily that also helps with her anxiety  She was previously using Maxalt as needed but has not had to use it for the past 5 years  She should keep her self well-hydrated.  Can also start magnesium oxide supplements    Return in about 1 year (around 5/20/2025).          Catherine Mccauley MD

## 2024-06-06 RX ORDER — VENLAFAXINE HYDROCHLORIDE 37.5 MG/1
CAPSULE, EXTENDED RELEASE ORAL
Qty: 30 CAPSULE | Refills: 2 | Status: SHIPPED | OUTPATIENT
Start: 2024-06-06

## 2024-06-06 NOTE — TELEPHONE ENCOUNTER
Rx Refill Note  Requested Prescriptions     Pending Prescriptions Disp Refills    venlafaxine XR (EFFEXOR-XR) 37.5 MG 24 hr capsule [Pharmacy Med Name: VENLAFAXINE HCL ER 37.5 MG 37.5 Capsule] 30 capsule 0     Sig: TAKE 1 CAPSULE BY MOUTH DAILY. -TAKE WITH FOOD      Last filled:5/9/24 0 refill  Last office visit with prescribing clinician: 5/20/2024      Next office visit with prescribing clinician: 5/20/2025     GIOVANNY VILLEDA  06/06/24, 13:47 EDT    Sent to pharmacy with 2 refills

## 2024-07-15 ENCOUNTER — TELEPHONE (OUTPATIENT)
Dept: NEUROLOGY | Facility: CLINIC | Age: 64
End: 2024-07-15

## 2024-07-15 NOTE — TELEPHONE ENCOUNTER
Caller: Radha Stafford    Relationship: Self    Best call back number:   Telephone Information:   Mobile [phone]         Which medication are you concerned about:     Fremanezumab-vfrm (Ajovy) 225 MG/1.5ML solution prefilled syringe       What are your concerns: A PRIOR AUTH IS NEEDED

## 2024-09-12 RX ORDER — VENLAFAXINE HYDROCHLORIDE 37.5 MG/1
CAPSULE, EXTENDED RELEASE ORAL
Qty: 30 CAPSULE | Refills: 5 | Status: SHIPPED | OUTPATIENT
Start: 2024-09-12

## 2024-09-12 NOTE — TELEPHONE ENCOUNTER
Rx Refill Note  Requested Prescriptions     Pending Prescriptions Disp Refills    venlafaxine XR (EFFEXOR-XR) 37.5 MG 24 hr capsule [Pharmacy Med Name: VENLAFAXINE HCL ER 37.5 MG 37.5 Capsule] 30 capsule 2     Sig: TAKE 1 CAPSULE BY MOUTH DAILY. -TAKE WITH FOOD      Last filled:6/6/24 2 refill  Last office visit with prescribing clinician: 5/20/2024      Next office visit with prescribing clinician: 5/20/2025     GIOVANNY VILLEDA  09/12/24, 12:23 EDT    Sent to pharmacy with 5refill

## 2024-10-09 RX ORDER — FREMANEZUMAB-VFRM 225 MG/1.5ML
225 INJECTION SUBCUTANEOUS
Qty: 4.5 EACH | Refills: 3 | Status: SHIPPED | OUTPATIENT
Start: 2024-10-09

## 2024-10-09 NOTE — TELEPHONE ENCOUNTER
Caller: Radha Stafford DINO    Relationship: Self    Best call back number: [phone]      Requested Prescriptions:   Requested Prescriptions     Pending Prescriptions Disp Refills    Fremanezumab-vfrm (Ajovy) 225 MG/1.5ML solution prefilled syringe 4.5 each 3     Sig: Inject 225 mg under the skin into the appropriate area as directed Every 30 (Thirty) Days.        Pharmacy where request should be sent:      Last office visit with prescribing clinician: 5/20/2024   Last telemedicine visit with prescribing clinician: Visit date not found   Next office visit with prescribing clinician: 5/20/2025     Additional details provided by patient: PT STATES ORIG RX SENT TO [facility], [facility] HAD ISSUES [facility] REFILL W/AUTH, PT WANTS TO CHANGE BACK TO [facility], PT IS DUE FOR INJECTION 10/14.    Does the patient have less than a 3 day supply:  [] Yes  [x] No    Would you like a call back once the refill request has been completed: [] Yes [x] No    If the office needs to give you a call back, can they leave a voicemail: [] Yes [x] No    Prabhjot Kumar Rep   10/09/24 12:10 EDT

## 2025-04-10 RX ORDER — VENLAFAXINE HYDROCHLORIDE 37.5 MG/1
CAPSULE, EXTENDED RELEASE ORAL
Qty: 30 CAPSULE | Refills: 1 | Status: SHIPPED | OUTPATIENT
Start: 2025-04-10

## 2025-04-10 NOTE — TELEPHONE ENCOUNTER
Rx Refill Note  Requested Prescriptions     Pending Prescriptions Disp Refills    venlafaxine XR (EFFEXOR-XR) 37.5 MG 24 hr capsule [Pharmacy Med Name: VENLAFAXINE HCL ER 37.5 MG 37.5 Capsule] 30 capsule 5     Sig: TAKE 1 CAPSULE BY MOUTH DAILY. -TAKE WITH FOOD      Last filled: 9/12/24 for 6 mos total  Last office visit with prescribing clinician: 5/20/2024      Next office visit with prescribing clinician: 5/20/2025     Darin Nixon MA  04/10/25, 08:51 EDT

## 2025-05-16 RX ORDER — VENLAFAXINE HYDROCHLORIDE 37.5 MG/1
CAPSULE, EXTENDED RELEASE ORAL
Qty: 30 CAPSULE | Refills: 1 | Status: SHIPPED | OUTPATIENT
Start: 2025-05-16

## 2025-05-16 NOTE — TELEPHONE ENCOUNTER
Rx Refill Note  Requested Prescriptions     Pending Prescriptions Disp Refills    venlafaxine XR (EFFEXOR-XR) 37.5 MG 24 hr capsule [Pharmacy Med Name: VENLAFAXINE HCL ER 37.5 MG 37.5 Capsule] 30 capsule 1     Sig: TAKE 1 CAPSULE BY MOUTH DAILY. -TAKE WITH FOOD      Last filled: 04/10/2025 30 days, 1 refill Sent In  Last office visit with prescribing clinician: 5/20/2024      Next office visit with prescribing clinician: 5/20/2025     Kelsea Fairchild RN  05/16/25, 12:22 EDT

## 2025-07-11 ENCOUNTER — SPECIALTY PHARMACY (OUTPATIENT)
Dept: GENERAL RADIOLOGY | Facility: HOSPITAL | Age: 65
End: 2025-07-11
Payer: COMMERCIAL

## 2025-07-11 RX ORDER — FREMANEZUMAB-VFRM 225 MG/1.5ML
225 INJECTION SUBCUTANEOUS
Qty: 4.5 EACH | Refills: 3 | OUTPATIENT
Start: 2025-07-11

## 2025-07-11 RX ORDER — FREMANEZUMAB-VFRM 225 MG/1.5ML
225 INJECTION SUBCUTANEOUS
Qty: 4.5 ML | Refills: 3 | Status: SHIPPED | OUTPATIENT
Start: 2025-07-11

## 2025-07-11 RX ORDER — VENLAFAXINE HYDROCHLORIDE 37.5 MG/1
CAPSULE, EXTENDED RELEASE ORAL
Qty: 30 CAPSULE | Refills: 0 | Status: SHIPPED | OUTPATIENT
Start: 2025-07-11

## 2025-07-11 NOTE — TELEPHONE ENCOUNTER
Rx Refill Note  Requested Prescriptions     Pending Prescriptions Disp Refills    venlafaxine XR (EFFEXOR-XR) 37.5 MG 24 hr capsule [Pharmacy Med Name: VENLAFAXINE HCL ER 37.5 MG 37.5 Capsule] 30 capsule 1     Sig: TAKE 1 CAPSULE BY MOUTH DAILY. -TAKE WITH FOOD      Last filled:5/16/25 1 ref  Last office visit with prescribing clinician: 5/20/2024      Next office visit with prescribing clinician: 9/23/2025     Yumi Parrish MA  07/11/25, 10:41 EDT    Sent to pharmacy-MUST SCHEDULE FOLLOW UP APPOINTMENT FOR ADDITIONAL REFILLS

## 2025-07-11 NOTE — PROGRESS NOTES
Specialty Pharmacy Patient Management Program  Neurology Initial Assessment     Radha Stafford is a 64 y.o. female with migraines seen by a Neurology provider and enrolled in the Neurology Patient Management program offered by [facility].  An initial outreach was conducted, including assessment of therapy appropriateness and specialty medication education for Ajovy. The patient was introduced to services offered by [facility], including: regular assessments, refill coordination, curbside pick-up or mail order delivery options, prior authorization maintenance, and financial assistance programs as applicable. The patient was also provided with contact information for the pharmacy team.     Insurance Coverage & Financial Support  [employer]  Ajovy Copay Card    Relevant Past Medical History and Comorbidities  Relevant medical history and concomitant health conditions were discussed with the patient. The patient's chart has been reviewed for relevant past medical history and comorbid health conditions and updated as necessary.   Past Medical History:   Diagnosis Date    Cancer     Hypertension     Migraine      Social History     Socioeconomic History    Marital status:    Tobacco Use    Smoking status: Never     Passive exposure: Never    Smokeless tobacco: Never   Vaping Use    Vaping status: Never Used   Substance and Sexual Activity    Alcohol use: Yes     Alcohol/week: 5.0 standard drinks of alcohol     Types: 5 Glasses of wine per week     Comment: social    Drug use: Never    Sexual activity: Yes     Partners: Male     Birth control/protection: Post-menopausal     Problem list reviewed by Natacha Richard [facility] on 7/11/2025 at  2:43 PM    Allergies  Known allergies and reactions were discussed with the patient. The patient's chart has been reviewed for  allergy information and updated as necessary.   Allergies   Allergen Reactions    Wasp Venom Protein  Anaphylaxis    Penicillins      Allergies reviewed by Natacha Richard RPH on 7/11/2025 at  2:43 PM    Relevant Laboratory Values    Current Medication List  This medication list has been reviewed with the patient and evaluated for any interactions or necessary modifications/recommendations, and updated to include all prescription medications, OTC medications, and supplements the patient is currently taking.  This list reflects what is contained in the patient's profile, which has also been marked as reviewed to communicate to other providers it is the most up to date version of the patient's current medication therapy.     Current Outpatient Medications:     Fremanezumab-vfrm (Ajovy) 225 MG/1.5ML solution prefilled syringe, Inject 225 mg under the skin into the appropriate area as directed Every 30 (Thirty) Days., Disp: 4.5 each, Rfl: 3    albuterol sulfate HFA (Ventolin HFA) 108 (90 Base) MCG/ACT inhaler, Every 8 (Eight) Hours., Disp: , Rfl:     EPIPEN 2-ISABELL 0.3 MG/0.3ML solution auto-injector injection, , Disp: , Rfl:     fluticasone (FLONASE) 50 MCG/ACT nasal spray, 1 SPRAY EACH NOSTRIL TWICE DAILY. USE TOGETHER WITH AZELASTINE NASAL SPRAY., Disp: , Rfl: 5    hydroCHLOROthiazide (HYDRODIURIL) 25 MG tablet, TAKE ONE-HALF TABLET BY MOUTH ONE TIME EVERY DAY, Disp: , Rfl:     ibuprofen (ADVIL,MOTRIN) 600 MG tablet, , Disp: , Rfl:     venlafaxine XR (EFFEXOR-XR) 37.5 MG 24 hr capsule, TAKE 1 CAPSULE BY MOUTH DAILY-TAKE WITH FOOD.  MUST SCHEDULE FOLLOW UP APPOINTMENT FOR ADDITIONAL REFILLS., Disp: 30 capsule, Rfl: 0    Medicines reviewed by Natacha Richard RPH on 7/11/2025 at  2:43 PM    Drug Interactions  None     Initial Education Provided for Specialty Medication  The patient has been provided with the following education and any applicable administration techniques (i.e. self-injection) have been demonstrated for the therapies indicated. All questions and concerns have been addressed prior to the patient  receiving the medication, and the patient has verbalized understanding of the education and any materials provided.  Additional patient education shall be provided and documented upon request by the patient, provider or payer.             Ajovy (fremanezumab-vfrm) 225 mg subQ every 28 days        Medication Expectations   Why am I taking this medication? You are taking this medication for migraine prophylaxis.   What should I expect while on this medication? You should expect to a decrease in the frequency and severity of your migraines.   How does the medication work? Ajovy is a monoclonal antibody that binds to calcitonin gene-related peptide (CGRP) and blocks its binding to the receptor decreasing the severity of migraines.   How long will I be on this medication for? The amount of time you will be on this medication will be determined by your doctor and your response to the medication.    How do I take this medication? Take as directed on your prescription label. This medication is a self-injection given every 28 days.    What are some possible side effects? Injection site reactions and hypersensitivity reactions.   What happens if I miss a dose? If you miss a dose, take it as soon as you remember, and time next dose 28 days from last dose.                  Medication Safety   What are things I should warn my doctor immediately about? Cases of anaphylaxis and angioedema have been reported in the postmarketing setting. If a reaction occurs, notify your doctor immediately.   What are things that I should be cautious of? Injection site reaction and hypersensitivity reactions, including rash, pruritus, drug hypersensitivity, and urticaria   What are some medications that can interact with this one? No drug interactions identified.            Medication Storage/Handling   How should I handle this medication? Keep this medication our of reach of pets/children in original container.  On the day your Ajovy is due let it  set at room temperature for 30 minutes prior to injection. (do NOT warm using a heat source such as hot water or a microwave).  Administer in the abdomen, thigh, back of the upper arm, or buttocks.  Do not inject where the skin is tender, bruised, red or hard.  Rotate injection sites.   How does this medication need to be stored? Store in refrigerator and keep dry.   How should I dispose of this medication? You can dispose of the empty syringe in a sharps container, and if this is not available you may use an empty hard plastic container such as a milk jug or tide container.            Resources/Support   How can I remind myself to take this medication? You can download a reminder jolie on your phone or use a calandar  to help with your monthly injection.   Is financial support available?  Yes, [employer] can provide co-pay cards if you have commercial insurance or patient assistance if you have Medicare or no insurance.    Which vaccines are recommended for me? Talk to your doctor about these vaccines: Flu, Coronavirus (COVID-19), Pneumococcal (pneumonia), Tdap, Hepatitis B, Zoster (shingles)                   Adherence and Self-Administration  Adherence related to the patient's specialty therapy was discussed with the patient. The Adherence segment of this outreach has been reviewed and updated.   Is there a concern with patient's ability to self administer the medication correctly and without issue?: No  Were any potential barriers to adherence identified during the initial assessment or patient education?: No  Are there any concerns regarding the patient's understanding of the importance of medication adherence?: No  Methods for Supporting Patient Adherence and/or Self-Administration: None    Goals of Therapy  Goals related to the patient's specialty therapy were discussed with the patient. The Patient Goals segment of this outreach has been reviewed and updated.   Goals Addressed Today        Specialty  Pharmacy General Goal      On Average, Reduce:   Frequency of migraines to 5 per month.   Symptom severity by 50% within 1 hours of taking acute therapy.   Duration of migraines to 2 hours.     Baseline Values/Notes on Enrollment  Frequency: 10 MMD  Symptom Severity: 8/10  Duration: Several hours    Date of Reassessment Notes on Progress Toward Above Goals   7/11/25 Converting Ajovy to internal fill. Patient reports she rarely has a breakthrough migraine.                                                         Reassessment Plan & Follow-Up  Medication Therapy Changes: Continue Ajovy 225 mg subcutaneously monthly  Related Plans, Therapy Recommendations, or Therapy Problems to Be Addressed: Patient has rescheduled appointment for 9/2025.   Pharmacist to perform regular reassessments no more than (6) months from the previous assessment.  Care Coordinator to set up future refill outreaches, coordinate prescription delivery, and escalate clinical questions to pharmacist.   Welcome information and patient satisfaction survey to be sent by specialty pharmacy team with patient's initial fill.    Attestation  Therapeutic appropriateness: Appropriate   I attest the patient was actively involved in and has agreed to the above plan of care. If the prescribed therapy is at any point deemed not appropriate based on the current or future assessments, a consultation will be initiated with the patient's specialty care provider to determine the best course of action. The revised plan of therapy will be documented along with any additional patient education provided. Discussed aforementioned material with patient via telemedicine.    Natacha Richard, PharmD, [facility]  Clinic Specialty Pharmacist, Neurology  7/11/2025  14:45 EDT